# Patient Record
Sex: MALE | Race: WHITE | URBAN - METROPOLITAN AREA
[De-identification: names, ages, dates, MRNs, and addresses within clinical notes are randomized per-mention and may not be internally consistent; named-entity substitution may affect disease eponyms.]

---

## 2018-05-02 ENCOUNTER — OFFICE VISIT (OUTPATIENT)
Dept: FAMILY MEDICINE CLINIC | Age: 28
End: 2018-05-02

## 2018-05-02 VITALS
BODY MASS INDEX: 25.92 KG/M2 | DIASTOLIC BLOOD PRESSURE: 88 MMHG | TEMPERATURE: 98.3 F | HEART RATE: 82 BPM | OXYGEN SATURATION: 98 % | SYSTOLIC BLOOD PRESSURE: 128 MMHG | RESPIRATION RATE: 16 BRPM | WEIGHT: 175 LBS | HEIGHT: 69 IN

## 2018-05-02 DIAGNOSIS — G47.00 INSOMNIA, UNSPECIFIED TYPE: ICD-10-CM

## 2018-05-02 DIAGNOSIS — Z76.0 MEDICATION REFILL: Primary | ICD-10-CM

## 2018-05-02 DIAGNOSIS — F32.A DEPRESSION, UNSPECIFIED DEPRESSION TYPE: ICD-10-CM

## 2018-05-02 RX ORDER — TRAZODONE HYDROCHLORIDE 50 MG/1
50 TABLET ORAL
Qty: 30 TAB | Refills: 3 | Status: SHIPPED | OUTPATIENT
Start: 2018-05-02 | End: 2018-10-04 | Stop reason: SDUPTHER

## 2018-05-02 RX ORDER — BUPROPION HYDROCHLORIDE 150 MG/1
150 TABLET ORAL
COMMUNITY
End: 2018-05-02 | Stop reason: SDUPTHER

## 2018-05-02 RX ORDER — BUPROPION HYDROCHLORIDE 150 MG/1
150 TABLET ORAL
Qty: 30 TAB | Refills: 3 | Status: SHIPPED | OUTPATIENT
Start: 2018-05-02 | End: 2018-11-07 | Stop reason: SDUPTHER

## 2018-05-02 RX ORDER — TRAZODONE HYDROCHLORIDE 50 MG/1
TABLET ORAL
COMMUNITY
End: 2018-05-02 | Stop reason: SDUPTHER

## 2018-05-02 NOTE — PROGRESS NOTES
HISTORY OF PRESENT ILLNESS  Jamila Vasquez is a 32 y.o. male. HPI Comments: Patient presents today to establish care for med refill. Previously under care through [facility] but pt took off a semester and provider no longer available. Past Medical History:  No date: Depression with anxiety  No date: H/O multiple concussions  No date: Insomnia     Past Surgical History:  2011: HX VASECTOMY     Review of patient's family history indicates:    No Known Problems              Mother                    No Known Problems              Father                     Social History Main Topics    Smoking status: Never Smoker                                                              Smokeless status: Never Used                        Alcohol use: Yes             Drug use: No              Sexual activity: Yes                  Current Outpatient Prescriptions:     buPROPion XL (WELLBUTRIN XL) 150 mg tablet, Take 1 Tab by mouth every morning.   traZODone (DESYREL) 50 mg tablet, Take 1 Tab by mouth nightly. Medication Refill   The history is provided by the patient. This is a new problem. The problem has not changed since onset. Pertinent negatives include no chest pain, no abdominal pain, no headaches and no shortness of breath. Review of Systems   Constitutional: Negative for chills and fever. Respiratory: Negative for shortness of breath. Cardiovascular: Negative for chest pain and palpitations. Gastrointestinal: Negative for abdominal pain. Neurological: Negative for headaches. Psychiatric/Behavioral: Positive for depression. The patient has insomnia. Physical Exam   Constitutional: He is oriented to person, place, and time. Vital signs are normal. He appears well-developed and well-nourished. Cardiovascular: Normal rate, regular rhythm, normal heart sounds and intact distal pulses. Pulmonary/Chest: Effort normal and breath sounds normal. No respiratory distress.    Neurological: He is alert and oriented to person, place, and time. Psychiatric: He has a normal mood and affect. His behavior is normal.     Visit Vitals    /88    Pulse 82    Temp 98.3 °F (36.8 °C) (Oral)    Resp 16    Ht 5' 9\" (1.753 m)    Wt 175 lb (79.4 kg)    SpO2 98%    BMI 25.84 kg/m2      ASSESSMENT and PLAN    ICD-10-CM ICD-9-CM    1. Medication refill Z76.0 V68.1 buPROPion XL (WELLBUTRIN XL) 150 mg tablet      traZODone (DESYREL) 50 mg tablet   2. Depression, unspecified depression type F32.9 311 buPROPion XL (WELLBUTRIN XL) 150 mg tablet   3. Insomnia, unspecified type G47.00 780.52 traZODone (DESYREL) 50 mg tablet     Reviewed plan with patient including diagnoses, treatment and follow up. Provided AVS with education on above diagnoses. No further questions/concerns at this time. Pt to follow up as scheduled or sooner if symptoms worsen/fail to improve.

## 2018-05-02 NOTE — MR AVS SNAPSHOT
[address] [place] [address] [phone] 
[phone] Patient: Leslee Monae MRN: [id number] :1990 Visit Information Date & Time Provider Department Dept. Phone Encounter #  
 2018 10:30 AM Ziggy Yeh NP [phone] [id number] Follow-up Instructions Return in about 3 months (around 2018), or if symptoms worsen or fail to improve. Upcoming Health Maintenance Date Due DTaP/Tdap/Td series (1 - Tdap) 10/22/2009 Influenza Age 5 to Adult 2018 Allergies as of 2018  Review Complete On: 2018 By: Doristine Spurling, LPN No Known Allergies Current Immunizations  Never Reviewed No immunizations on file. Not reviewed this visit You Were Diagnosed With   
  
 Codes Comments Medication refill    -  Primary ICD-10-CM: Z76.0 ICD-9-CM: V68.1 Depression, unspecified depression type     ICD-10-CM: F32.9 ICD-9-CM: 790 Insomnia, unspecified type     ICD-10-CM: G47.00 ICD-9-CM: 780.52 Vitals BP Pulse Temp Resp Height(growth percentile) Weight(growth percentile) 128/88 82 98.3 °F (36.8 °C) (Oral) 16 5' 9\" (1.753 m) 175 lb (79.4 kg) SpO2 BMI Smoking Status 98% 25.84 kg/m2 Never Smoker Vitals History BMI and BSA Data Body Mass Index Body Surface Area  
 25.84 kg/m 2 1.97 m 2 Preferred Pharmacy Pharmacy Name Phone [id number] [address] [place] [phone] Your Updated Medication List  
  
   
This list is accurate as of 18 10:35 AM.  Always use your most recent med list.  
  
  
  
  
 buPROPion  mg tablet Commonly known as:  Sriram Lock Take 1 Tab by mouth every morning. traZODone 50 mg tablet Commonly known as:  Georganna Quiver Take 1 Tab by mouth nightly. Prescriptions Sent to Pharmacy Refills buPROPion XL (WELLBUTRIN XL) 150 mg tablet 3 Sig: Take 1 Tab by mouth every morning. Class: Normal  
 Pharmacy: [address] [place] [address] #: [phone] Route: Oral  
 traZODone (DESYREL) 50 mg tablet 3 Sig: Take 1 Tab by mouth nightly. Class: Normal  
 Pharmacy: [address] [place] [address] #: [phone] Route: Oral  
  
Follow-up Instructions Return in about 3 months (around 8/2/2018), or if symptoms worsen or fail to improve. Introducing [facility]! [facility] introduces [employer] patient portal. Now you can access parts of your medical record, email your doctor's office, and request medication refills online. 1. In your internet browser, go to [email or web address]/[email or web address] 2. Click on the First Time User? Click Here link in the Sign In box. You will see the New Member Sign Up page. 3. Enter your [employer] Access Code exactly as it appears below. You will not need to use this code after youve completed the sign-up process. If you do not sign up before the expiration date, you must request a new code. · [employer] Access Code: [email or web address] Expires: 7/31/2018 10:23 AM 
 
4. Enter the last four digits of your Social Security Number (xxxx) and Date of Birth (mm/dd/yyyy) as indicated and click Submit. You will be taken to the next sign-up page. 5. Create a [employer] ID. This will be your [employer] login ID and cannot be changed, so think of one that is secure and easy to remember. 6. Create a [employer] password. You can change your password at any time. 7. Enter your Password Reset Question and Answer. This can be used at a later time if you forget your password. 8. Enter your e-mail address. You will receive e-mail notification when new information is available in [address] 9. Click Sign Up. You can now view and download portions of your medical record. 10. Click the Download Summary menu link to download a portable copy of your medical information. If you have questions, please visit the Frequently Asked Questions section of the [employer] website. Remember, [employer] is NOT to be used for urgent needs. For medical emergencies, dial 911. Now available from your iPhone and Android! Please provide this summary of care documentation to your next provider. If you have any questions after today's visit, please call [phone].

## 2018-05-02 NOTE — PROGRESS NOTES
Rm 1  Pt presents to the clinic for a med refill. Requested Prescriptions     Pending Prescriptions Disp Refills    buPROPion XL (WELLBUTRIN XL) 150 mg tablet 30 Tab      Sig: Take 1 Tab by mouth every morning.  traZODone (DESYREL) 50 mg tablet       Sig: Take  by mouth nightly. Depression Screening:  PHQ over the last two weeks 5/2/2018   Little interest or pleasure in doing things Not at all   Feeling down, depressed or hopeless Not at all   Total Score PHQ 2 0       Learning Assessment:  Learning Assessment 5/2/2018   PRIMARY LEARNER Patient   HIGHEST LEVEL OF EDUCATION - PRIMARY LEARNER  SOME COLLEGE   BARRIERS PRIMARY LEARNER NONE   CO-LEARNER CAREGIVER No   PRIMARY LANGUAGE ENGLISH   LEARNER PREFERENCE PRIMARY DEMONSTRATION   ANSWERED BY Patient   RELATIONSHIP SELF       Abuse Screening:  No flowsheet data found. Health Maintenance reviewed and discussed per provider: yes     Coordination of Care:    1. Have you been to the ER, urgent care clinic since your last visit? Hospitalized since your last visit? no    2. Have you seen or consulted any other health care providers outside of the [address] [place] [address] since your last visit? Include any pap smears or colon screening.  no

## 2018-10-01 DIAGNOSIS — G47.00 INSOMNIA, UNSPECIFIED TYPE: ICD-10-CM

## 2018-10-01 DIAGNOSIS — Z76.0 MEDICATION REFILL: ICD-10-CM

## 2018-10-01 RX ORDER — TRAZODONE HYDROCHLORIDE 50 MG/1
50 TABLET ORAL
Qty: 30 TAB | Refills: 3 | Status: CANCELLED | OUTPATIENT
Start: 2018-10-01

## 2018-10-01 NOTE — TELEPHONE ENCOUNTER
Requested Prescriptions     Pending Prescriptions Disp Refills    traZODone (DESYREL) 50 mg tablet 30 Tab 3     Sig: Take 1 Tab by mouth nightly.

## 2018-10-04 DIAGNOSIS — G47.00 INSOMNIA, UNSPECIFIED TYPE: ICD-10-CM

## 2018-10-04 DIAGNOSIS — Z76.0 MEDICATION REFILL: ICD-10-CM

## 2018-10-04 RX ORDER — TRAZODONE HYDROCHLORIDE 50 MG/1
50 TABLET ORAL
Qty: 30 TAB | Refills: 3 | Status: SHIPPED | OUTPATIENT
Start: 2018-10-04 | End: 2019-02-10 | Stop reason: SDUPTHER

## 2018-12-19 DIAGNOSIS — F32.A DEPRESSION, UNSPECIFIED DEPRESSION TYPE: ICD-10-CM

## 2018-12-19 DIAGNOSIS — Z76.0 MEDICATION REFILL: ICD-10-CM

## 2018-12-19 RX ORDER — BUPROPION HYDROCHLORIDE 150 MG/1
TABLET ORAL
Qty: 90 TAB | Refills: 0 | Status: SHIPPED | OUTPATIENT
Start: 2018-12-19 | End: 2019-04-29 | Stop reason: SDUPTHER

## 2019-04-03 ENCOUNTER — OFFICE VISIT (OUTPATIENT)
Dept: INTERNAL MEDICINE CLINIC | Age: 29
End: 2019-04-03

## 2019-04-03 VITALS
HEART RATE: 84 BPM | TEMPERATURE: 97.8 F | WEIGHT: 183 LBS | DIASTOLIC BLOOD PRESSURE: 92 MMHG | BODY MASS INDEX: 27.11 KG/M2 | OXYGEN SATURATION: 98 % | SYSTOLIC BLOOD PRESSURE: 143 MMHG | RESPIRATION RATE: 14 BRPM | HEIGHT: 69 IN

## 2019-04-03 DIAGNOSIS — I10 ESSENTIAL HYPERTENSION: ICD-10-CM

## 2019-04-03 DIAGNOSIS — G47.00 INSOMNIA, UNSPECIFIED TYPE: ICD-10-CM

## 2019-04-03 DIAGNOSIS — F41.9 ANXIETY AND DEPRESSION: Primary | ICD-10-CM

## 2019-04-03 DIAGNOSIS — F32.A ANXIETY AND DEPRESSION: Primary | ICD-10-CM

## 2019-04-03 RX ORDER — TRAZODONE HYDROCHLORIDE 50 MG/1
TABLET ORAL
Qty: 60 TAB | Refills: 2 | Status: SHIPPED | OUTPATIENT
Start: 2019-04-03 | End: 2019-04-29 | Stop reason: SDUPTHER

## 2019-04-03 RX ORDER — SERTRALINE HYDROCHLORIDE 50 MG/1
50 TABLET, FILM COATED ORAL DAILY
Qty: 30 TAB | Refills: 2 | Status: SHIPPED | OUTPATIENT
Start: 2019-04-03 | End: 2019-04-29 | Stop reason: SDUPTHER

## 2019-04-03 NOTE — PROGRESS NOTES
HISTORY OF PRESENT ILLNESS  Moncho Muir is a 25 y.o. male. HPI  Mr. Gurpreet Nichols presents for increased anxiety and insomnia for the past several months. He has a chronic hx of both and has taken medication intermittently since his teens. For insomnia, hx of Ambien, Lunesta, and Seroquel. He was off medication for several years as insomnia improved, but a little over 1 year ago, symptoms worsened, and he started Trazodone 50 mg. This controlled his symptoms until as of late. For anxiety and depression, he started Wellbutrin  mg at the same time as the Trazodone. He admits this helped his depression more than his anxiety. He feels he is suffering more from anxiety as of late than depression. Hx of Klonopin for anxiety in the past as well. If he had an Rx for this, he reports he would take it ~2-3x weekly. Hx of Zoloft as a teenager. He recalls no AE. 2) HTN - hx of medication use, but he achieved control and was able to start medication. This is our first reading since May which was better. Review of Systems   Psychiatric/Behavioral: The patient is nervous/anxious and has insomnia. Visit Vitals  BP (!) 143/92 (BP 1 Location: Right arm, BP Patient Position: Sitting)   Pulse 84   Temp 97.8 °F (36.6 °C) (Oral)   Resp 14   Ht 5' 9\" (1.753 m)   Wt 183 lb (83 kg)   SpO2 98%   BMI 27.02 kg/m²   L manual 150/100    Physical Exam   Constitutional: He is oriented to person, place, and time. He appears well-developed and well-nourished. No distress. HENT:   Head: Normocephalic and atraumatic. Right Ear: Tympanic membrane, external ear and ear canal normal.   Left Ear: Tympanic membrane, external ear and ear canal normal.   Nose: Nose normal.   Mouth/Throat: Uvula is midline, oropharynx is clear and moist and mucous membranes are normal. No oropharyngeal exudate, posterior oropharyngeal edema, posterior oropharyngeal erythema or tonsillar abscesses.    Eyes: Pupils are equal, round, and reactive to light. Conjunctivae are normal. No scleral icterus. Neck: Neck supple. Cardiovascular: Normal rate, regular rhythm and normal heart sounds. Exam reveals no gallop. No murmur heard. Pulses:       Dorsalis pedis pulses are 2+ on the right side, and 2+ on the left side. Posterior tibial pulses are 2+ on the right side, and 2+ on the left side. No pedal edema. Pulmonary/Chest: Effort normal and breath sounds normal. No respiratory distress. He has no decreased breath sounds. He has no wheezes. He has no rhonchi. He has no rales. Lymphadenopathy:        Head (right side): No submandibular and no tonsillar adenopathy present. Head (left side): No submandibular and no tonsillar adenopathy present. He has no cervical adenopathy. Right: No supraclavicular adenopathy present. Left: No supraclavicular adenopathy present. Neurological: He is alert and oriented to person, place, and time. Skin: Skin is warm and dry. Psychiatric: He has a normal mood and affect. His speech is normal.       ASSESSMENT and PLAN  Diagnoses and all orders for this visit:    1. Anxiety and depression  -     sertraline (ZOLOFT) 50 mg tablet; Take 1 Tab by mouth daily.  - Options discussed: 1) dosage increase of Wellbutrin - we dismissed this option d/t it controlling depression better than anxiety. 2) Addition of SSRI or SNRI - we agree to proceed with this option with a goal of improved anxiety control. 2. Insomnia, unspecified type  -     traZODone (DESYREL) 50 mg tablet; TAKE 2 TABLETS BY MOUTH EVERY DAY AT NIGHT   - Dosage increase. Start with 75 mg x ~2 weeks. Then further increase if needed. Continue with 75 mg if controlled. 3. Essential hypertension  - Closely monitor. Recheck next visit. Follow-up and Dispositions    · Return in about 3 weeks (around 4/24/2019) for follow-up anxiety and insomnia; recheck BP.

## 2019-04-03 NOTE — PROGRESS NOTES
Patient presents to the clinic to discuss increasing his dosage of trazodone and Wellbutrin. Patient complains of waking up at 3 am and increase anxiety. Patient believes the meditation is not as effective as to when he first began the medications.

## 2019-04-29 ENCOUNTER — HOSPITAL ENCOUNTER (OUTPATIENT)
Dept: LAB | Age: 29
Discharge: HOME | End: 2019-04-29
Payer: SELF-PAY

## 2019-04-29 ENCOUNTER — OFFICE VISIT (OUTPATIENT)
Dept: INTERNAL MEDICINE CLINIC | Age: 29
End: 2019-04-29

## 2019-04-29 VITALS
WEIGHT: 181 LBS | HEIGHT: 69 IN | TEMPERATURE: 98.6 F | SYSTOLIC BLOOD PRESSURE: 138 MMHG | DIASTOLIC BLOOD PRESSURE: 92 MMHG | OXYGEN SATURATION: 98 % | HEART RATE: 84 BPM | RESPIRATION RATE: 14 BRPM | BODY MASS INDEX: 26.81 KG/M2

## 2019-04-29 DIAGNOSIS — F32.A ANXIETY AND DEPRESSION: Primary | ICD-10-CM

## 2019-04-29 DIAGNOSIS — F41.9 ANXIETY AND DEPRESSION: Primary | ICD-10-CM

## 2019-04-29 DIAGNOSIS — G47.00 INSOMNIA, UNSPECIFIED TYPE: ICD-10-CM

## 2019-04-29 DIAGNOSIS — I10 ESSENTIAL HYPERTENSION: ICD-10-CM

## 2019-04-29 LAB
ALBUMIN SERPL-MCNC: 4.3 G/DL (ref 3.4–5)
ALBUMIN/GLOB SERPL: 1.4 {RATIO} (ref 0.8–1.7)
ALP SERPL-CCNC: 57 U/L (ref 45–117)
ALT SERPL-CCNC: 34 U/L (ref 16–61)
ANION GAP SERPL CALC-SCNC: 9 MMOL/L (ref 3–18)
AST SERPL-CCNC: 12 U/L (ref 15–37)
BILIRUB SERPL-MCNC: 0.6 MG/DL (ref 0.2–1)
BILIRUB UR QL STRIP: NEGATIVE
BUN SERPL-MCNC: 17 MG/DL (ref 7–18)
BUN/CREAT SERPL: 18 (ref 12–20)
CALCIUM SERPL-MCNC: 9.3 MG/DL (ref 8.5–10.1)
CHLORIDE SERPL-SCNC: 103 MMOL/L (ref 100–108)
CHOLEST SERPL-MCNC: 188 MG/DL
CO2 SERPL-SCNC: 27 MMOL/L (ref 21–32)
CREAT SERPL-MCNC: 0.97 MG/DL (ref 0.6–1.3)
ERYTHROCYTE [DISTWIDTH] IN BLOOD BY AUTOMATED COUNT: 13.1 % (ref 11.6–14.5)
GLOBULIN SER CALC-MCNC: 3.1 G/DL (ref 2–4)
GLUCOSE SERPL-MCNC: 69 MG/DL (ref 74–99)
GLUCOSE UR-MCNC: NEGATIVE MG/DL
HBA1C MFR BLD: 4.5 % (ref 4.2–5.6)
HCT VFR BLD AUTO: 46.5 % (ref 36–48)
HDLC SERPL-MCNC: 68 MG/DL (ref 40–60)
HDLC SERPL: 2.8 {RATIO} (ref 0–5)
HGB BLD-MCNC: 15.5 G/DL (ref 13–16)
KETONES P FAST UR STRIP-MCNC: NEGATIVE MG/DL
LDLC SERPL CALC-MCNC: 96.6 MG/DL (ref 0–100)
LIPID PROFILE,FLP: ABNORMAL
MCH RBC QN AUTO: 33 PG (ref 24–34)
MCHC RBC AUTO-ENTMCNC: 33.3 G/DL (ref 31–37)
MCV RBC AUTO: 99.1 FL (ref 74–97)
PH UR STRIP: 5.5 [PH] (ref 4.6–8)
PLATELET # BLD AUTO: 283 K/UL (ref 135–420)
PMV BLD AUTO: 11.3 FL (ref 9.2–11.8)
POTASSIUM SERPL-SCNC: 4 MMOL/L (ref 3.5–5.5)
PROT SERPL-MCNC: 7.4 G/DL (ref 6.4–8.2)
PROT UR QL STRIP: NEGATIVE
RBC # BLD AUTO: 4.69 M/UL (ref 4.7–5.5)
SODIUM SERPL-SCNC: 139 MMOL/L (ref 136–145)
SP GR UR STRIP: 1.02 (ref 1–1.03)
TRIGL SERPL-MCNC: 117 MG/DL (ref ?–150)
TSH SERPL DL<=0.05 MIU/L-ACNC: 1.12 UIU/ML (ref 0.36–3.74)
UA UROBILINOGEN AMB POC: NORMAL (ref 0.2–1)
URINALYSIS CLARITY POC: CLEAR
URINALYSIS COLOR POC: YELLOW
URINE BLOOD POC: NEGATIVE
URINE LEUKOCYTES POC: NEGATIVE
URINE NITRITES POC: NEGATIVE
VLDLC SERPL CALC-MCNC: 23.4 MG/DL
WBC # BLD AUTO: 6.9 K/UL (ref 4.6–13.2)

## 2019-04-29 PROCEDURE — 85027 COMPLETE CBC AUTOMATED: CPT

## 2019-04-29 PROCEDURE — 80053 COMPREHEN METABOLIC PANEL: CPT

## 2019-04-29 PROCEDURE — 80061 LIPID PANEL: CPT

## 2019-04-29 PROCEDURE — 36415 COLL VENOUS BLD VENIPUNCTURE: CPT

## 2019-04-29 PROCEDURE — 84443 ASSAY THYROID STIM HORMONE: CPT

## 2019-04-29 PROCEDURE — 83036 HEMOGLOBIN GLYCOSYLATED A1C: CPT

## 2019-04-29 RX ORDER — BUPROPION HYDROCHLORIDE 150 MG/1
TABLET ORAL
Qty: 90 TAB | Refills: 0 | Status: SHIPPED | OUTPATIENT
Start: 2019-04-29 | End: 2019-07-29 | Stop reason: SDUPTHER

## 2019-04-29 RX ORDER — TRAZODONE HYDROCHLORIDE 50 MG/1
TABLET ORAL
Qty: 180 TAB | Refills: 0 | Status: SHIPPED | OUTPATIENT
Start: 2019-04-29 | End: 2019-07-29 | Stop reason: SDUPTHER

## 2019-04-29 RX ORDER — SERTRALINE HYDROCHLORIDE 50 MG/1
50 TABLET, FILM COATED ORAL DAILY
Qty: 90 TAB | Refills: 0 | Status: SHIPPED | OUTPATIENT
Start: 2019-04-29 | End: 2019-06-14

## 2019-04-29 NOTE — PROGRESS NOTES
Patient presents to the clinic for a medication evaluation. Patient was prescribed Zoloft. Patient states he feels a slight difference.

## 2019-04-29 NOTE — PATIENT INSTRUCTIONS
High Blood Pressure: Care Instructions  Overview    It's normal for blood pressure to go up and down throughout the day. But if it stays up, you have high blood pressure. Another name for high blood pressure is hypertension. Despite what a lot of people think, high blood pressure usually doesn't cause headaches or make you feel dizzy or lightheaded. It usually has no symptoms. But it does increase your risk of stroke, heart attack, and other problems. You and your doctor will talk about your risks of these problems based on your blood pressure. Your doctor will give you a goal for your blood pressure. Your goal will be based on your health and your age. Lifestyle changes, such as eating healthy and being active, are always important to help lower blood pressure. You might also take medicine to reach your blood pressure goal.  Follow-up care is a key part of your treatment and safety. Be sure to make and go to all appointments, and call your doctor if you are having problems. It's also a good idea to know your test results and keep a list of the medicines you take. How can you care for yourself at home? Medical treatment  · If you stop taking your medicine, your blood pressure will go back up. You may take one or more types of medicine to lower your blood pressure. Be safe with medicines. Take your medicine exactly as prescribed. Call your doctor if you think you are having a problem with your medicine. · Talk to your doctor before you start taking aspirin every day. Aspirin can help certain people lower their risk of a heart attack or stroke. But taking aspirin isn't right for everyone, because it can cause serious bleeding. · See your doctor regularly. You may need to see the doctor more often at first or until your blood pressure comes down. · If you are taking blood pressure medicine, talk to your doctor before you take decongestants or anti-inflammatory medicine, such as ibuprofen.  Some of these medicines can raise blood pressure. · Learn how to check your blood pressure at home. Lifestyle changes  · Stay at a healthy weight. This is especially important if you put on weight around the waist. Losing even 10 pounds can help you lower your blood pressure. · If your doctor recommends it, get more exercise. Walking is a good choice. Bit by bit, increase the amount you walk every day. Try for at least 30 minutes on most days of the week. You also may want to swim, bike, or do other activities. · Avoid or limit alcohol. Talk to your doctor about whether you can drink any alcohol. · Try to limit how much sodium you eat to less than 2,300 milligrams (mg) a day. Your doctor may ask you to try to eat less than 1,500 mg a day. · Eat plenty of fruits (such as bananas and oranges), vegetables, legumes, whole grains, and low-fat dairy products. · Lower the amount of saturated fat in your diet. Saturated fat is found in animal products such as milk, cheese, and meat. Limiting these foods may help you lose weight and also lower your risk for heart disease. · Do not smoke. Smoking increases your risk for heart attack and stroke. If you need help quitting, talk to your doctor about stop-smoking programs and medicines. These can increase your chances of quitting for good. When should you call for help? Call 911 anytime you think you may need emergency care. This may mean having symptoms that suggest that your blood pressure is causing a serious heart or blood vessel problem. Your blood pressure may be over 180/120.   For example, call 911 if:    · You have symptoms of a heart attack. These may include:  ? Chest pain or pressure, or a strange feeling in the chest.  ? Sweating. ? Shortness of breath. ? Nausea or vomiting. ? Pain, pressure, or a strange feeling in the back, neck, jaw, or upper belly or in one or both shoulders or arms. ? Lightheadedness or sudden weakness.   ? A fast or irregular heartbeat.     · You have symptoms of a stroke. These may include:  ? Sudden numbness, tingling, weakness, or loss of movement in your face, arm, or leg, especially on only one side of your body. ? Sudden vision changes. ? Sudden trouble speaking. ? Sudden confusion or trouble understanding simple statements. ? Sudden problems with walking or balance. ? A sudden, severe headache that is different from past headaches.     · You have severe back or belly pain.    Do not wait until your blood pressure comes down on its own. Get help right away.   Call your doctor now or seek immediate care if:    · Your blood pressure is much higher than normal (such as 180/120 or higher), but you don't have symptoms.     · You think high blood pressure is causing symptoms, such as:  ? Severe headache.  ? Blurry vision.    Watch closely for changes in your health, and be sure to contact your doctor if:    · Your blood pressure measures higher than your doctor recommends at least 2 times. That means the top number is higher or the bottom number is higher, or both.     · You think you may be having side effects from your blood pressure medicine. Where can you learn more? Go to http://karlos-chris.info/. Enter U584 in the search box to learn more about \"High Blood Pressure: Care Instructions. \"  Current as of: July 22, 2018  Content Version: 11.9  © 5984-0937 [employer]. Care instructions adapted under license by [employer] (which disclaims liability or warranty for this information). If you have questions about a medical condition or this instruction, always ask your healthcare professional. [place] [address] any warranty or liability for your use of this information. DASH Diet: Care Instructions  Your Care Instructions    The DASH diet is an eating plan that can help lower your blood pressure. DASH stands for Dietary Approaches to Stop Hypertension.  Hypertension is high blood pressure. The DASH diet focuses on eating foods that are high in calcium, potassium, and magnesium. These nutrients can lower blood pressure. The foods that are highest in these nutrients are fruits, vegetables, low-fat dairy products, nuts, seeds, and legumes. But taking calcium, potassium, and magnesium supplements instead of eating foods that are high in those nutrients does not have the same effect. The DASH diet also includes whole grains, fish, and poultry. The DASH diet is one of several lifestyle changes your doctor may recommend to lower your high blood pressure. Your doctor may also want you to decrease the amount of sodium in your diet. Lowering sodium while following the DASH diet can lower blood pressure even further than just the DASH diet alone. Follow-up care is a key part of your treatment and safety. Be sure to make and go to all appointments, and call your doctor if you are having problems. It's also a good idea to know your test results and keep a list of the medicines you take. How can you care for yourself at home? Following the DASH diet  · Eat 4 to 5 servings of fruit each day. A serving is 1 medium-sized piece of fruit, ½ cup chopped or canned fruit, 1/4 cup dried fruit, or 4 ounces (½ cup) of fruit juice. Choose fruit more often than fruit juice. · Eat 4 to 5 servings of vegetables each day. A serving is 1 cup of lettuce or raw leafy vegetables, ½ cup of chopped or cooked vegetables, or 4 ounces (½ cup) of vegetable juice. Choose vegetables more often than vegetable juice. · Get 2 to 3 servings of low-fat and fat-free dairy each day. A serving is 8 ounces of milk, 1 cup of yogurt, or 1 ½ ounces of cheese. · Eat 6 to 8 servings of grains each day. A serving is 1 slice of bread, 1 ounce of dry cereal, or ½ cup of cooked rice, pasta, or cooked cereal. Try to choose whole-grain products as much as possible. · Limit lean meat, poultry, and fish to 2 servings each day.  A serving is 3 ounces, about the size of a deck of cards. · Eat 4 to 5 servings of nuts, seeds, and legumes (cooked dried beans, lentils, and split peas) each week. A serving is 1/3 cup of nuts, 2 tablespoons of seeds, or ½ cup of cooked beans or peas. · Limit fats and oils to 2 to 3 servings each day. A serving is 1 teaspoon of vegetable oil or 2 tablespoons of salad dressing. · Limit sweets and added sugars to 5 servings or less a week. A serving is 1 tablespoon jelly or jam, ½ cup sorbet, or 1 cup of lemonade. · Eat less than 2,300 milligrams (mg) of sodium a day. If you limit your sodium to 1,500 mg a day, you can lower your blood pressure even more. Tips for success  · Start small. Do not try to make dramatic changes to your diet all at once. You might feel that you are missing out on your favorite foods and then be more likely to not follow the plan. Make small changes, and stick with them. Once those changes become habit, add a few more changes. · Try some of the following:  ? Make it a goal to eat a fruit or vegetable at every meal and at snacks. This will make it easy to get the recommended amount of fruits and vegetables each day. ? Try yogurt topped with fruit and nuts for a snack or healthy dessert. ? Add lettuce, tomato, cucumber, and onion to sandwiches. ? Combine a ready-made pizza crust with low-fat mozzarella cheese and lots of vegetable toppings. Try using tomatoes, squash, spinach, broccoli, carrots, cauliflower, and onions. ? Have a variety of cut-up vegetables with a low-fat dip as an appetizer instead of chips and dip. ? Sprinkle sunflower seeds or chopped almonds over salads. Or try adding chopped walnuts or almonds to cooked vegetables. ? Try some vegetarian meals using beans and peas. Add garbanzo or kidney beans to salads. Make burritos and tacos with mashed phelps beans or black beans. Where can you learn more? Go to http://everett-chris.info/.   Enter E421 in the search box to learn more about \"DASH Diet: Care Instructions. \"  Current as of: July 22, 2018  Content Version: 11.9  © 2392-8320 [employer]. Care instructions adapted under license by [employer] (which disclaims liability or warranty for this information). If you have questions about a medical condition or this instruction, always ask your healthcare professional. [address] any warranty or liability for your use of this information.

## 2019-04-29 NOTE — PROGRESS NOTES
HISTORY OF PRESENT ILLNESS  Evelyn Correa is a 25 y.o. male. HPI  Mr. Raina Chakraborty presents for follow-up anxiety, depression, and insomnia. He reports improved anxiety and depression with addition of Zoloft. He reports significantly improved insomnia with dosage increase of Trazodone. He denies AE with any medication. He desires to continue at its current dosage. 2) HTN - hx of requiring medication, but he increased his exercise and was able to control it. He would like to work to increase his exercise to control this now. He admits to eating a lot of pre-prepared meals and eating out as well. He cooks very little. Review of Systems   Neurological: Negative for dizziness and headaches. Psychiatric/Behavioral: Positive for depression. The patient is nervous/anxious and has insomnia (much improved). Visit Vitals  BP (!) 138/92 (BP 1 Location: Right arm, BP Patient Position: Sitting)   Pulse 84   Temp 98.6 °F (37 °C) (Oral)   Resp 14   Ht 5' 9\" (1.753 m)   Wt 181 lb (82.1 kg)   SpO2 98%   BMI 26.73 kg/m²       Physical Exam   Constitutional: He is oriented to person, place, and time. He appears well-developed and well-nourished. No distress. HENT:   Head: Normocephalic and atraumatic. Right Ear: Tympanic membrane, external ear and ear canal normal.   Left Ear: Tympanic membrane, external ear and ear canal normal.   Nose: Nose normal.   Mouth/Throat: Uvula is midline, oropharynx is clear and moist and mucous membranes are normal. No oropharyngeal exudate, posterior oropharyngeal edema, posterior oropharyngeal erythema or tonsillar abscesses. Eyes: Pupils are equal, round, and reactive to light. Conjunctivae are normal. No scleral icterus. Neck: Neck supple. Cardiovascular: Normal rate, regular rhythm and normal heart sounds. Exam reveals no gallop. No murmur heard. Pulses:       Dorsalis pedis pulses are 2+ on the right side, and 2+ on the left side.         Posterior tibial pulses are 2+ on the right side, and 2+ on the left side. No pedal edema. Pulmonary/Chest: Effort normal and breath sounds normal. No respiratory distress. He has no decreased breath sounds. He has no wheezes. He has no rhonchi. He has no rales. Lymphadenopathy:        Head (right side): No submandibular and no tonsillar adenopathy present. Head (left side): No submandibular and no tonsillar adenopathy present. He has no cervical adenopathy. Right: No supraclavicular adenopathy present. Left: No supraclavicular adenopathy present. Neurological: He is alert and oriented to person, place, and time. Skin: Skin is warm and dry. Psychiatric: He has a normal mood and affect. His speech is normal.     Results for orders placed or performed in visit on 04/29/19   AMB POC URINALYSIS DIP STICK AUTO W/O MICRO   Result Value Ref Range    Color (UA POC) Yellow     Clarity (UA POC) Clear     Glucose (UA POC) Negative Negative    Bilirubin (UA POC) Negative Negative    Ketones (UA POC) Negative Negative    Specific gravity (UA POC) 1.025 1.001 - 1.035    Blood (UA POC) Negative Negative    pH (UA POC) 5.5 4.6 - 8.0    Protein (UA POC) Negative Negative    Urobilinogen (UA POC) 0.2 mg/dL 0.2 - 1    Nitrites (UA POC) Negative Negative    Leukocyte esterase (UA POC) Negative Negative       ASSESSMENT and PLAN  Diagnoses and all orders for this visit:    1. Anxiety and depression  -     sertraline (ZOLOFT) 50 mg tablet; Take 1 Tab by mouth daily. -     buPROPion XL (WELLBUTRIN XL) 150 mg tablet; TAKE 1 TABLET BY MOUTH DAILY IN THE MORNING  - Continue same. Patient to notify me if he desires a dosage increase of Zoloft prior to our next visit in 3 months. 2. Insomnia, unspecified type  -     traZODone (DESYREL) 50 mg tablet; TAKE 2 TABLETS BY MOUTH EVERY DAY AT NIGHT    3. Essential hypertension  -     CBC W/O DIFF; Future  -     METABOLIC PANEL, COMPREHENSIVE; Future  -     LIPID PANEL;  Future  -     HEMOGLOBIN A1C W/O EAG; Future  -     TSH 3RD GENERATION; Future  -     AMB POC URINALYSIS DIP STICK AUTO W/O MICRO  - Plan to improve lifestyle over the next 3 months and recheck. If still not controlled, will resume medication. Risks of chronically uncontrolled HTN discussed. Follow-up and Dispositions    · Return in about 3 months (around 7/29/2019) for follow-up HTN, anxiety, depression, insomnia.

## 2019-07-29 ENCOUNTER — OFFICE VISIT (OUTPATIENT)
Dept: INTERNAL MEDICINE CLINIC | Age: 29
End: 2019-07-29

## 2019-07-29 VITALS
HEIGHT: 69 IN | SYSTOLIC BLOOD PRESSURE: 136 MMHG | BODY MASS INDEX: 28.14 KG/M2 | TEMPERATURE: 98.3 F | OXYGEN SATURATION: 98 % | RESPIRATION RATE: 16 BRPM | HEART RATE: 76 BPM | WEIGHT: 190 LBS | DIASTOLIC BLOOD PRESSURE: 86 MMHG

## 2019-07-29 DIAGNOSIS — F41.9 ANXIETY AND DEPRESSION: Primary | ICD-10-CM

## 2019-07-29 DIAGNOSIS — G47.00 INSOMNIA, UNSPECIFIED TYPE: ICD-10-CM

## 2019-07-29 DIAGNOSIS — I10 ESSENTIAL HYPERTENSION: ICD-10-CM

## 2019-07-29 DIAGNOSIS — F32.A ANXIETY AND DEPRESSION: Primary | ICD-10-CM

## 2019-07-29 RX ORDER — SERTRALINE HYDROCHLORIDE 100 MG/1
150 TABLET, FILM COATED ORAL DAILY
Qty: 45 TAB | Refills: 5 | Status: SHIPPED | OUTPATIENT
Start: 2019-07-29 | End: 2019-12-30 | Stop reason: SDUPTHER

## 2019-07-29 RX ORDER — BUPROPION HYDROCHLORIDE 150 MG/1
TABLET ORAL
Qty: 30 TAB | Refills: 5 | Status: SHIPPED | OUTPATIENT
Start: 2019-07-29 | End: 2019-12-30 | Stop reason: SDUPTHER

## 2019-07-29 RX ORDER — TRAZODONE HYDROCHLORIDE 50 MG/1
TABLET ORAL
Qty: 60 TAB | Refills: 5 | Status: SHIPPED | OUTPATIENT
Start: 2019-07-29 | End: 2019-12-30 | Stop reason: SDUPTHER

## 2019-07-29 NOTE — PROGRESS NOTES
1. Have you been to the ER, urgent care clinic since your last visit? Hospitalized since your last visit? No    2. Have you seen or consulted any other health care providers outside of the [address] [place] [address] since your last visit? Include any pap smears or colon screening. No     Patient presents in office today for routine care.   Patient concerns: med eval

## 2019-07-29 NOTE — PROGRESS NOTES
HISTORY OF PRESENT ILLNESS  Amanda Williamson is a 25 y.o. male. HPI  Mr. Sheila Miguel presents for follow-up anxiety, depression, insomnia, and HTN. 1) Anxiety/depression/anxiety - he feels his depression and insomnia are well-controlled with his current therapy, but c/o anxiety being less well-controlled. He feels his anxiety symptoms remain above what would be expected for certain stressful situations. Anxiety is overall better, however. - He continues to follow with his counseling weekly which is going well. 2) HTN - improved. He joined a gym. He is eating better. Review of Systems   Psychiatric/Behavioral: Positive for depression (controlled). The patient is nervous/anxious (less well-controlled) and has insomnia (controlled). Visit Vitals  /86 (BP 1 Location: Right arm, BP Patient Position: Sitting)   Pulse 76   Temp 98.3 °F (36.8 °C) (Oral)   Resp 16   Ht 5' 9\" (1.753 m)   Wt 190 lb (86.2 kg)   SpO2 98%   BMI 28.06 kg/m²       Physical Exam   Constitutional: He is oriented to person, place, and time. He appears well-developed and well-nourished. No distress. HENT:   Head: Normocephalic and atraumatic. Right Ear: Tympanic membrane, external ear and ear canal normal.   Left Ear: Tympanic membrane, external ear and ear canal normal.   Nose: Nose normal.   Mouth/Throat: Uvula is midline, oropharynx is clear and moist and mucous membranes are normal. No oropharyngeal exudate, posterior oropharyngeal edema, posterior oropharyngeal erythema or tonsillar abscesses. Eyes: Pupils are equal, round, and reactive to light. Conjunctivae are normal. No scleral icterus. Neck: Neck supple. Cardiovascular: Normal rate, regular rhythm and normal heart sounds. Exam reveals no gallop. No murmur heard. Pulses:       Dorsalis pedis pulses are 2+ on the right side, and 2+ on the left side. Posterior tibial pulses are 2+ on the right side, and 2+ on the left side. No pedal edema. Pulmonary/Chest: Effort normal and breath sounds normal. No respiratory distress. He has no decreased breath sounds. He has no wheezes. He has no rhonchi. He has no rales. Lymphadenopathy:        Head (right side): No submandibular and no tonsillar adenopathy present. Head (left side): No submandibular and no tonsillar adenopathy present. He has no cervical adenopathy. Right: No supraclavicular adenopathy present. Left: No supraclavicular adenopathy present. Neurological: He is alert and oriented to person, place, and time. Skin: Skin is warm and dry. Psychiatric: He has a normal mood and affect. His speech is normal.       ASSESSMENT and PLAN  Diagnoses and all orders for this visit:    1. Anxiety and depression  -     sertraline (ZOLOFT) 100 mg tablet; Take 1.5 Tabs by mouth daily.   - Dosage increase to 150 mg daily from 100 mg daily. -     buPROPion XL (WELLBUTRIN XL) 150 mg tablet; TAKE 1 TABLET BY MOUTH DAILY IN THE MORNING    2. Insomnia, unspecified type  -     traZODone (DESYREL) 50 mg tablet; TAKE 2 TABLETS BY MOUTH EVERY DAY AT NIGHT    3. Essential hypertension  - Continue with improved lifestyle. BP is lifestyle-controlled at present. Follow-up and Dispositions    · Return in about 6 months (around 1/29/2020) for follow-up anxiety, depression, insomnia.

## 2019-09-24 DIAGNOSIS — F41.9 ANXIETY AND DEPRESSION: ICD-10-CM

## 2019-09-24 DIAGNOSIS — F32.A ANXIETY AND DEPRESSION: ICD-10-CM

## 2019-09-26 RX ORDER — SERTRALINE HYDROCHLORIDE 100 MG/1
TABLET, FILM COATED ORAL
Qty: 90 TAB | Refills: 0 | Status: SHIPPED | OUTPATIENT
Start: 2019-09-26 | End: 2019-12-30 | Stop reason: SDUPTHER

## 2019-09-26 NOTE — TELEPHONE ENCOUNTER
Sent electronically:    Requested Prescriptions     Signed Prescriptions Disp Refills    sertraline (ZOLOFT) 100 mg tablet 90 Tab 0     Sig: TAKE 1 TABLET BY MOUTH EVERY DAY     Authorizing Provider: Aries Mosquera     This is 90 day supply until he can get an appt. With ASHVIN Bermudez for follow up.

## 2019-09-26 NOTE — TELEPHONE ENCOUNTER
Patient contacted at home number. 2 patient identifiers confirmed. Patient informed of below. Patient verbalized understanding. Patient scheduled for follow up with Sarina for Monday, December 23, 2019 04:00 PM.  No other questions at this time.

## 2019-12-30 ENCOUNTER — OFFICE VISIT (OUTPATIENT)
Dept: INTERNAL MEDICINE CLINIC | Age: 29
End: 2019-12-30

## 2019-12-30 VITALS
HEART RATE: 83 BPM | RESPIRATION RATE: 16 BRPM | WEIGHT: 196.2 LBS | OXYGEN SATURATION: 98 % | DIASTOLIC BLOOD PRESSURE: 100 MMHG | BODY MASS INDEX: 29.06 KG/M2 | TEMPERATURE: 98.2 F | HEIGHT: 69 IN | SYSTOLIC BLOOD PRESSURE: 160 MMHG

## 2019-12-30 DIAGNOSIS — F32.A ANXIETY AND DEPRESSION: Primary | ICD-10-CM

## 2019-12-30 DIAGNOSIS — F41.9 ANXIETY AND DEPRESSION: Primary | ICD-10-CM

## 2019-12-30 DIAGNOSIS — I10 ESSENTIAL HYPERTENSION: ICD-10-CM

## 2019-12-30 DIAGNOSIS — Z23 ENCOUNTER FOR IMMUNIZATION: ICD-10-CM

## 2019-12-30 DIAGNOSIS — G47.00 INSOMNIA, UNSPECIFIED TYPE: ICD-10-CM

## 2019-12-30 RX ORDER — SERTRALINE HYDROCHLORIDE 100 MG/1
100 TABLET, FILM COATED ORAL DAILY
Qty: 30 TAB | Refills: 11 | Status: SHIPPED | OUTPATIENT
Start: 2019-12-30 | End: 2020-06-10 | Stop reason: SDUPTHER

## 2019-12-30 RX ORDER — TRAZODONE HYDROCHLORIDE 50 MG/1
TABLET ORAL
Qty: 60 TAB | Refills: 11 | Status: SHIPPED | OUTPATIENT
Start: 2019-12-30 | End: 2020-02-21 | Stop reason: ALTCHOICE

## 2019-12-30 RX ORDER — BUPROPION HYDROCHLORIDE 150 MG/1
TABLET ORAL
Qty: 30 TAB | Refills: 11 | Status: SHIPPED | OUTPATIENT
Start: 2019-12-30 | End: 2020-10-16 | Stop reason: SDUPTHER

## 2019-12-30 NOTE — PROGRESS NOTES
Teodora Stoll is a 30 y.o.  male presents today for office visit for follow up. Pt would also like to discuss  depression. Pt is not fasting. Pt is in Room # 12      1. Have you been to the ER, urgent care clinic since your last visit? Hospitalized since your last visit? No    2. Have you seen or consulted any other health care providers outside of the [address] [place] [address] since your last visit? Include any pap smears or colon screening. No    Upcoming Appts  none    Health Maintenance reviewed      VORB: No orders of the defined types were placed in this encounter.   Sabas Melgoza LPN

## 2019-12-30 NOTE — PROGRESS NOTES
HISTORY OF PRESENT ILLNESS  Symone Epps is a 30 y.o. male. HPI  F/u anxiety/depression/insomnia - reports very well-controlled. - Only taking Trazodone 25-50 mg nightly. - Taking Wellbutrin xl 150 mg daily. - Taking Zoloft 100 mg daily. Reports dosage increase was unnecessary so has since gone back to the lower dosage. 2) HTN - BP significantly increased from previous.   - Has been working out less. Admits to higher-Na diet. Is drinking less EtOH - 2-3 beers/night max. - Fam hx HTN - both parents. Review of Systems   Respiratory: Negative for shortness of breath. Cardiovascular: Negative for chest pain and leg swelling. Neurological: Negative for dizziness and headaches. Psychiatric/Behavioral: Positive for depression (controlled). The patient is nervous/anxious (controlled) and has insomnia (controlled). Visit Vitals  BP (!) 160/100 (BP 1 Location: Left arm, BP Patient Position: Sitting)   Pulse 83   Temp 98.2 °F (36.8 °C) (Oral)   Resp 16   Ht 5' 9\" (1.753 m)   Wt 196 lb 3.2 oz (89 kg)   SpO2 98%   BMI 28.97 kg/m²     Wt Readings from Last 3 Encounters:   12/30/19 196 lb 3.2 oz (89 kg)   07/29/19 190 lb (86.2 kg)   04/29/19 181 lb (82.1 kg)     BP Readings from Last 3 Encounters:   12/30/19 (!) 160/100   07/29/19 136/86   04/29/19 (!) 138/92       Physical Exam  Constitutional:       General: He is not in acute distress. Appearance: Normal appearance. He is well-developed. HENT:      Head: Normocephalic and atraumatic. Right Ear: Tympanic membrane, ear canal and external ear normal.      Left Ear: Tympanic membrane, ear canal and external ear normal.      Nose: Nose normal.      Mouth/Throat:      Mouth: Mucous membranes are moist.      Pharynx: Uvula midline. No oropharyngeal exudate or posterior oropharyngeal erythema. Tonsils: No tonsillar abscesses. Eyes:      General: No scleral icterus.      Conjunctiva/sclera: Conjunctivae normal.      Pupils: Pupils are equal, round, and reactive to light. Neck:      Musculoskeletal: Neck supple. Cardiovascular:      Rate and Rhythm: Normal rate and regular rhythm. Pulses: Normal pulses. Dorsalis pedis pulses are 2+ on the right side and 2+ on the left side. Posterior tibial pulses are 2+ on the right side and 2+ on the left side. Heart sounds: Normal heart sounds. No murmur. No gallop. Pulmonary:      Effort: Pulmonary effort is normal. No respiratory distress. Breath sounds: Normal breath sounds. No decreased breath sounds, wheezing, rhonchi or rales. Musculoskeletal:      Right lower leg: No edema. Left lower leg: No edema. Lymphadenopathy:      Head:      Right side of head: No submandibular or tonsillar adenopathy. Left side of head: No submandibular or tonsillar adenopathy. Cervical: No cervical adenopathy. Upper Body:      Right upper body: No supraclavicular adenopathy. Left upper body: No supraclavicular adenopathy. Skin:     General: Skin is warm and dry. Neurological:      Mental Status: He is alert and oriented to person, place, and time. Psychiatric:         Speech: Speech normal.         ASSESSMENT and PLAN  Diagnoses and all orders for this visit:    1. Anxiety and depression  -     sertraline (ZOLOFT) 100 mg tablet; Take 1 Tab by mouth daily. -     buPROPion XL (WELLBUTRIN XL) 150 mg tablet; TAKE 1 TABLET BY MOUTH DAILY IN THE MORNING  - Well-controlled. Continue current. 2. Insomnia, unspecified type  -     traZODone (DESYREL) 50 mg tablet; TAKE 2 TABLETS BY MOUTH EVERY DAY AT NIGHT    3. Essential hypertension  - Medication discussed. Patient wishes to hold, improve lifestyle, recheck 1 month. Advised to check some outside BP's if at all possible. Bring in for review.      4. Encounter for immunization  -     INFLUENZA VIRUS VAC QUAD,SPLIT,PRESV FREE SYRINGE IM  -     [place] IMMUNIZ ADMIN,1 SINGLE/COMB VAC/TOXOID      Follow-up and Dispositions    · Return in about 1 month (around 1/30/2020) for f/u BP.

## 2020-02-21 ENCOUNTER — OFFICE VISIT (OUTPATIENT)
Dept: INTERNAL MEDICINE CLINIC | Age: 30
End: 2020-02-21

## 2020-02-21 VITALS
OXYGEN SATURATION: 97 % | RESPIRATION RATE: 18 BRPM | HEART RATE: 79 BPM | HEIGHT: 69 IN | BODY MASS INDEX: 29.33 KG/M2 | TEMPERATURE: 98.2 F | SYSTOLIC BLOOD PRESSURE: 146 MMHG | WEIGHT: 198 LBS | DIASTOLIC BLOOD PRESSURE: 94 MMHG

## 2020-02-21 DIAGNOSIS — F32.A ANXIETY AND DEPRESSION: ICD-10-CM

## 2020-02-21 DIAGNOSIS — I10 ESSENTIAL HYPERTENSION: Primary | ICD-10-CM

## 2020-02-21 DIAGNOSIS — F41.9 ANXIETY AND DEPRESSION: ICD-10-CM

## 2020-02-21 DIAGNOSIS — F51.04 CHRONIC INSOMNIA: ICD-10-CM

## 2020-02-21 RX ORDER — METOPROLOL SUCCINATE 50 MG/1
50 TABLET, EXTENDED RELEASE ORAL DAILY
Qty: 30 TAB | Refills: 2 | Status: SHIPPED | OUTPATIENT
Start: 2020-02-21 | End: 2020-03-23 | Stop reason: ALTCHOICE

## 2020-02-21 RX ORDER — MIRTAZAPINE 15 MG/1
15 TABLET, FILM COATED ORAL
Qty: 30 TAB | Refills: 2 | Status: SHIPPED | OUTPATIENT
Start: 2020-02-21 | End: 2020-03-23 | Stop reason: ALTCHOICE

## 2020-02-21 NOTE — PROGRESS NOTES
HISTORY OF PRESENT ILLNESS  Yohannes Sánchez is a 30 y.o. male. HPI  Returns to care for f/u.  1) HTN - improved but remains elevated. He has resumed a healthier lifestyle with exercise resumption and improved diet. - C/o frequent temporal head pain and swelling. 2) Chronic insomnia - c/o Trazodone making anxiety worse and c/o increased temporal swelling with use. He desires an alternative. - Hx Seroquel, Ambien, Lunesta, Risperal for insomnia in the past. Believes they all worked (it has been years since he took these) but likely changed due to AE. Reports they were concerned about hx of substance abuse with Ambien. This is remote. Review of Systems   HENT: Negative for tinnitus. Eyes: Negative for blurred vision. Respiratory: Negative for shortness of breath. Cardiovascular: Negative for chest pain. Neurological: Positive for headaches. Negative for dizziness. Psychiatric/Behavioral: The patient is nervous/anxious and has insomnia. Visit Vitals  BP (!) 146/94 Comment: L manual   Pulse 79   Temp 98.2 °F (36.8 °C) (Oral)   Resp 18   Ht 5' 9\" (1.753 m)   Wt 198 lb (89.8 kg)   SpO2 97%   BMI 29.24 kg/m²       Physical Exam  Constitutional:       General: He is not in acute distress. Appearance: Normal appearance. He is well-developed. HENT:      Head: Normocephalic and atraumatic. Comments: No temporal swelling or erythema bilaterally. Right Ear: Tympanic membrane, ear canal and external ear normal.      Left Ear: Tympanic membrane, ear canal and external ear normal.      Nose: Nose normal.      Mouth/Throat:      Mouth: Mucous membranes are moist.      Pharynx: Uvula midline. No oropharyngeal exudate or posterior oropharyngeal erythema. Tonsils: No tonsillar abscesses. Eyes:      General: No scleral icterus. Conjunctiva/sclera: Conjunctivae normal.      Pupils: Pupils are equal, round, and reactive to light. Neck:      Musculoskeletal: Neck supple. Cardiovascular:      Rate and Rhythm: Normal rate and regular rhythm. Pulses: Normal pulses. Dorsalis pedis pulses are 2+ on the right side and 2+ on the left side. Posterior tibial pulses are 2+ on the right side and 2+ on the left side. Heart sounds: Normal heart sounds. No murmur. No gallop. Pulmonary:      Effort: Pulmonary effort is normal. No respiratory distress. Breath sounds: Normal breath sounds. No decreased breath sounds, wheezing, rhonchi or rales. Musculoskeletal:      Right lower leg: No edema. Left lower leg: No edema. Lymphadenopathy:      Head:      Right side of head: No submandibular or tonsillar adenopathy. Left side of head: No submandibular or tonsillar adenopathy. Cervical: No cervical adenopathy. Upper Body:      Right upper body: No supraclavicular adenopathy. Left upper body: No supraclavicular adenopathy. Skin:     General: Skin is warm and dry. Neurological:      Mental Status: He is alert and oriented to person, place, and time. Psychiatric:         Speech: Speech normal.         ASSESSMENT and PLAN  Diagnoses and all orders for this visit:    1. Essential hypertension  -     metoprolol succinate (TOPROL-XL) 50 mg XL tablet; Take 1 Tab by mouth daily.   - Trial medication for improved BP as well as beneficial effects on anxiety. Cautioned regarding ED. If this should occur, will change. 2. Chronic insomnia  -     mirtazapine (REMERON) 15 mg tablet; Take 1 Tab by mouth nightly. To start, take 1/2 tab nightly x 1-2 weeks. - Discussed we will monitor weight with this. - D/c Trazodone. 3. Anxiety and depression  - Cont Zoloft, Wellbutrin. Follow-up and Dispositions    · Return in about 1 month (around 3/21/2020) for follow-up BP, insomnia.

## 2020-02-21 NOTE — PROGRESS NOTES
Rm: 12    Chief Complaint   Patient presents with    Anxiety     Depression Screening:  3 most recent PHQ Screens 4/3/2019 5/2/2018   Little interest or pleasure in doing things Not at all Not at all   Feeling down, depressed, irritable, or hopeless Several days Not at all   Total Score PHQ 2 1 0       Learning Assessment:  Learning Assessment 5/2/2018   PRIMARY LEARNER Patient   HIGHEST LEVEL OF EDUCATION - PRIMARY LEARNER  SOME COLLEGE   BARRIERS PRIMARY LEARNER NONE   CO-LEARNER CAREGIVER No   PRIMARY LANGUAGE ENGLISH   LEARNER PREFERENCE PRIMARY DEMONSTRATION   ANSWERED BY Patient   RELATIONSHIP SELF       Abuse Screening:  No flowsheet data found. Health Maintenance reviewed and discussed per provider: yes     Coordination of Care:    1. Have you been to the ER, urgent care clinic since your last visit? Hospitalized since your last visit? no    2. Have you seen or consulted any other health care providers outside of the [address] [place] [address] since your last visit? Include any pap smears or colon screening.  no

## 2020-03-23 ENCOUNTER — OFFICE VISIT (OUTPATIENT)
Dept: INTERNAL MEDICINE CLINIC | Age: 30
End: 2020-03-23

## 2020-03-23 VITALS
HEIGHT: 69 IN | TEMPERATURE: 97.7 F | HEART RATE: 72 BPM | OXYGEN SATURATION: 96 % | BODY MASS INDEX: 31.4 KG/M2 | DIASTOLIC BLOOD PRESSURE: 86 MMHG | WEIGHT: 212 LBS | RESPIRATION RATE: 18 BRPM | SYSTOLIC BLOOD PRESSURE: 146 MMHG

## 2020-03-23 DIAGNOSIS — F51.04 CHRONIC INSOMNIA: ICD-10-CM

## 2020-03-23 DIAGNOSIS — I10 ESSENTIAL HYPERTENSION: Primary | ICD-10-CM

## 2020-03-23 RX ORDER — ESZOPICLONE 2 MG/1
2 TABLET, FILM COATED ORAL
Qty: 30 TAB | Refills: 2 | Status: SHIPPED | OUTPATIENT
Start: 2020-03-23 | End: 2020-04-29

## 2020-03-23 RX ORDER — AMLODIPINE BESYLATE 5 MG/1
5 TABLET ORAL DAILY
Qty: 30 TAB | Refills: 2 | Status: SHIPPED | OUTPATIENT
Start: 2020-03-23 | End: 2020-04-29 | Stop reason: SDUPTHER

## 2020-03-23 NOTE — PROGRESS NOTES
Rm: 11    Chief Complaint   Patient presents with    Hypertension     Depression Screening:  3 most recent PHQ Screens 4/3/2019 5/2/2018   Little interest or pleasure in doing things Not at all Not at all   Feeling down, depressed, irritable, or hopeless Several days Not at all   Total Score PHQ 2 1 0       Learning Assessment:  Learning Assessment 5/2/2018   PRIMARY LEARNER Patient   HIGHEST LEVEL OF EDUCATION - PRIMARY LEARNER  SOME COLLEGE   BARRIERS PRIMARY LEARNER NONE   CO-LEARNER CAREGIVER No   PRIMARY LANGUAGE ENGLISH   LEARNER PREFERENCE PRIMARY DEMONSTRATION   ANSWERED BY Patient   RELATIONSHIP SELF       Abuse Screening:  No flowsheet data found. Health Maintenance reviewed and discussed per provider: yes     Coordination of Care:    1. Have you been to the ER, urgent care clinic since your last visit? Hospitalized since your last visit? no    2. Have you seen or consulted any other health care providers outside of the [address] [place] [address] since your last visit? Include any pap smears or colon screening.  no

## 2020-03-23 NOTE — PROGRESS NOTES
HISTORY OF PRESENT ILLNESS  Shawna Steele is a 30 y.o. male. HPI   Presents for follow-up from visit 1 month ago. See previous note. 1) HTN - BP not significantly improved with Toprol XL 50 mg daily.   - Does believe Toprol has helped some with his anxiety. - C/o jhony temporal swelling. 2) Insomnia - Remeron working well, but has gained 14 lbs since his last visit. Denies any significant change of his lifestyle. - Hx of Trazodone, melatonin, Ambien, Lunesta, Seroquel, and Risperidone. - Ambien and Lunesta worked. 3) Anxiety - he reports good control of this. Review of Systems   Psychiatric/Behavioral: Positive for depression (controlled). The patient is nervous/anxious (controlled) and has insomnia. Visit Vitals  /86 Comment: B manual   Pulse 72   Temp 97.7 °F (36.5 °C) (Oral)   Resp 18   Ht 5' 9\" (1.753 m)   Wt 212 lb (96.2 kg)   SpO2 96%   BMI 31.31 kg/m²       Physical Exam  Constitutional:       General: He is not in acute distress. Appearance: Normal appearance. He is well-developed. Comments: I do not appreciate any temporal swelling or abnormality. HENT:      Head: Normocephalic and atraumatic. Right Ear: Tympanic membrane, ear canal and external ear normal.      Left Ear: Tympanic membrane, ear canal and external ear normal.      Nose: Nose normal.      Mouth/Throat:      Mouth: Mucous membranes are moist.      Pharynx: Uvula midline. No oropharyngeal exudate or posterior oropharyngeal erythema. Tonsils: No tonsillar abscesses. Eyes:      General: No scleral icterus. Conjunctiva/sclera: Conjunctivae normal.      Pupils: Pupils are equal, round, and reactive to light. Neck:      Musculoskeletal: Neck supple. Cardiovascular:      Rate and Rhythm: Normal rate and regular rhythm. Pulses: Normal pulses. Dorsalis pedis pulses are 2+ on the right side and 2+ on the left side.         Posterior tibial pulses are 2+ on the right side and 2+ on the left side. Heart sounds: Normal heart sounds. No murmur. No gallop. Pulmonary:      Effort: Pulmonary effort is normal. No respiratory distress. Breath sounds: Normal breath sounds. No decreased breath sounds, wheezing, rhonchi or rales. Musculoskeletal:      Right lower leg: No edema. Left lower leg: No edema. Lymphadenopathy:      Head:      Right side of head: No submandibular or tonsillar adenopathy. Left side of head: No submandibular or tonsillar adenopathy. Cervical: No cervical adenopathy. Upper Body:      Right upper body: No supraclavicular adenopathy. Left upper body: No supraclavicular adenopathy. Skin:     General: Skin is warm and dry. Neurological:      Mental Status: He is alert and oriented to person, place, and time. Psychiatric:         Speech: Speech normal.         ASSESSMENT and PLAN  Diagnoses and all orders for this visit:    1. Essential hypertension  -     amLODIPine (NORVASC) 5 mg tablet; Take 1 Tab by mouth daily. - Changed from Toprol XL 50 mg. Goal of better efficacy of above regarding BP. May need to combine with ACEI in the future. 2. Chronic insomnia  -     eszopiclone (LUNESTA) 2 mg tablet; Take 1 Tab by mouth nightly. Max Daily Amount: 2 mg. - Options considered:  1) Change Remeron to Lunesta. Despite efficacy, continuing Remeron is not desired given significant weight gain. 2) Change Zoloft to evening Paxil. He prefers to avoid a change of Zoloft as he feels this is working very well for him. - We agree to proceed with Option 1. Usage / AE's of lunesta discussed/reviewed. Risks discussed. Follow-up and Dispositions    · Return in about 1 month (around 4/23/2020) for follow-up HTN.

## 2020-04-29 ENCOUNTER — VIRTUAL VISIT (OUTPATIENT)
Dept: INTERNAL MEDICINE CLINIC | Age: 30
End: 2020-04-29

## 2020-04-29 DIAGNOSIS — F51.04 CHRONIC INSOMNIA: Primary | ICD-10-CM

## 2020-04-29 DIAGNOSIS — F32.A ANXIETY AND DEPRESSION: ICD-10-CM

## 2020-04-29 DIAGNOSIS — F41.9 ANXIETY AND DEPRESSION: ICD-10-CM

## 2020-04-29 DIAGNOSIS — I10 ESSENTIAL HYPERTENSION: ICD-10-CM

## 2020-04-29 RX ORDER — AMLODIPINE BESYLATE 5 MG/1
5 TABLET ORAL DAILY
Qty: 30 TAB | Refills: 2 | Status: SHIPPED | OUTPATIENT
Start: 2020-04-29 | End: 2020-06-10 | Stop reason: SDUPTHER

## 2020-04-29 RX ORDER — ESZOPICLONE 3 MG/1
3 TABLET, FILM COATED ORAL
Qty: 30 TAB | Refills: 2 | Status: SHIPPED | OUTPATIENT
Start: 2020-04-29 | End: 2020-06-10 | Stop reason: SDUPTHER

## 2020-04-29 NOTE — PROGRESS NOTES
Tavon Michael is a 30 y.o. male evaluated via telephone on 4/29/2020. Consent:  He and/or health care decision maker is aware that he may receive a bill for this telephone service, depending on his insurance coverage, and has provided verbal consent to proceed: Yes      Documentation:  I communicated with the patient and/or health care decision maker about     1) HTN - tolerating amlodipine 5 mg well and without complaint. He believes that his BP has improved. No more temporal throbbing. He admits that he had been consuming higher amounts of EtOH - states this was a wake-up call to him. He admits to 5-6 beers/night, sometimes more. He stopped this a few days ago. He had some problems but is feeling better now. 2) Chronic insomnia - Lunesta not working consistently. It helps some nights but he still has problems falling and staying asleep other nights. 3) He has lost the weight he has gained. Admits the weight gain helped him look at his lifestyle and EtOH consumption. 4) anxiety and depression - reports still well-controlled with Zoloft and Wellbutrin. Details of this discussion including any medical advice provided:   Diagnoses and all orders for this visit:    1. Chronic insomnia  -     eszopiclone (LUNESTA) 3 mg tablet; Take 1 Tab by mouth nightly. Max Daily Amount: 3 mg.   - Dosage increase.  - Advised that stopping the EtOH will actually promote healthier sleep. I am extremely pleased with what he has done in regard to his EtOH consumption. 2. Essential hypertension  -     amLODIPine (NORVASC) 5 mg tablet; Take 1 Tab by mouth daily. - Cont same for now. With d/c of EtOH, may be able to reduce or d/c in the future. Will monitor. 3. Anxiety and depression  - Cont Zoloft and Wellbutrin. Follow-up and Dispositions    · Return in about 1 month (around 5/29/2020) for follow up.            I affirm this is a Patient Initiated Episode with an Established Patient who has not had a related appointment within my department in the past 7 days or scheduled within the next 24 hours.     Total Time: minutes: 11-20 minutes    Note: not billable if this call serves to triage the patient into an appointment for the relevant concern      Jass ASHVIN Moy

## 2020-06-10 ENCOUNTER — OFFICE VISIT (OUTPATIENT)
Dept: INTERNAL MEDICINE CLINIC | Age: 30
End: 2020-06-10

## 2020-06-10 VITALS
SYSTOLIC BLOOD PRESSURE: 136 MMHG | HEART RATE: 81 BPM | HEIGHT: 69 IN | DIASTOLIC BLOOD PRESSURE: 98 MMHG | WEIGHT: 207 LBS | BODY MASS INDEX: 30.66 KG/M2 | RESPIRATION RATE: 20 BRPM | TEMPERATURE: 96.9 F | OXYGEN SATURATION: 97 %

## 2020-06-10 DIAGNOSIS — F32.A ANXIETY AND DEPRESSION: ICD-10-CM

## 2020-06-10 DIAGNOSIS — F51.04 CHRONIC INSOMNIA: ICD-10-CM

## 2020-06-10 DIAGNOSIS — F41.9 ANXIETY AND DEPRESSION: ICD-10-CM

## 2020-06-10 DIAGNOSIS — I10 ESSENTIAL HYPERTENSION: Primary | ICD-10-CM

## 2020-06-10 RX ORDER — SERTRALINE HYDROCHLORIDE 100 MG/1
150 TABLET, FILM COATED ORAL DAILY
Qty: 45 TAB | Refills: 11 | Status: SHIPPED | OUTPATIENT
Start: 2020-06-10 | End: 2021-08-04 | Stop reason: ALTCHOICE

## 2020-06-10 RX ORDER — ESZOPICLONE 3 MG/1
3 TABLET, FILM COATED ORAL
Qty: 30 TAB | Refills: 2 | Status: SHIPPED | OUTPATIENT
Start: 2020-06-10 | End: 2020-10-16 | Stop reason: SDUPTHER

## 2020-06-10 RX ORDER — AMLODIPINE BESYLATE 5 MG/1
5 TABLET ORAL DAILY
Qty: 30 TAB | Refills: 11 | Status: SHIPPED | OUTPATIENT
Start: 2020-06-10 | End: 2020-09-04 | Stop reason: ALTCHOICE

## 2020-06-10 NOTE — PROGRESS NOTES
HISTORY OF PRESENT ILLNESS  Alea Yost is a 30 y.o. male. HPI  Presents for f/u.     1) HTN - BP elevated in the office today. No home checks. He feels the temporal throbbing he noted previously is better. No AE with amlodipine.   - EtOH consumption is still down. Admits to ~3 beers/day. 2) Anxiety and depression - feels anxiety could be better controlled. He would be agreeable to trying a dosage increase of Zoloft. 3) Insomnia - Lunesta helping. Admits still has some sleeping problems, but he prefers Lunesta to Trazodone. Remeron worked well, but he gained significant weight. Review of Systems   Respiratory: Negative for shortness of breath. Cardiovascular: Negative for chest pain and leg swelling. Neurological: Negative for dizziness and headaches. Psychiatric/Behavioral: Positive for depression (controlled). The patient is nervous/anxious (controlled) and has insomnia (but tolerable). Visit Vitals  BP (!) 136/98   Pulse 81   Temp 96.9 °F (36.1 °C) (Oral)   Resp 20   Ht 5' 9\" (1.753 m)   Wt 207 lb (93.9 kg)   SpO2 97%   BMI 30.57 kg/m²     Wt Readings from Last 3 Encounters:   06/10/20 207 lb (93.9 kg)   03/23/20 212 lb (96.2 kg)   02/21/20 198 lb (89.8 kg)     BP Readings from Last 3 Encounters:   06/10/20 (!) 136/98   03/23/20 146/86   02/21/20 (!) 146/94       Physical Exam  Constitutional:       General: He is not in acute distress. Appearance: Normal appearance. He is well-developed. HENT:      Head: Normocephalic and atraumatic. Right Ear: Tympanic membrane, ear canal and external ear normal.      Left Ear: Tympanic membrane, ear canal and external ear normal.      Nose: Nose normal.      Mouth/Throat:      Mouth: Mucous membranes are moist.   Eyes:      General: No scleral icterus. Conjunctiva/sclera: Conjunctivae normal.      Pupils: Pupils are equal, round, and reactive to light. Neck:      Musculoskeletal: Neck supple.    Cardiovascular:      Rate and Rhythm: Normal rate and regular rhythm. Pulses: Normal pulses. Dorsalis pedis pulses are 2+ on the right side and 2+ on the left side. Posterior tibial pulses are 2+ on the right side and 2+ on the left side. Heart sounds: Normal heart sounds. No murmur. No gallop. Pulmonary:      Effort: Pulmonary effort is normal. No respiratory distress. Breath sounds: Normal breath sounds. No decreased breath sounds, wheezing, rhonchi or rales. Musculoskeletal:      Right lower leg: No edema. Left lower leg: No edema. Lymphadenopathy:      Head:      Right side of head: No submandibular or tonsillar adenopathy. Left side of head: No submandibular or tonsillar adenopathy. Cervical: No cervical adenopathy. Upper Body:      Right upper body: No supraclavicular adenopathy. Left upper body: No supraclavicular adenopathy. Skin:     General: Skin is warm and dry. Neurological:      Mental Status: He is alert and oriented to person, place, and time. Psychiatric:         Speech: Speech normal.         ASSESSMENT and PLAN  Diagnoses and all orders for this visit:    1. Essential hypertension  -     [facility] BP FLOWSHEET  -     amLODIPine (NORVASC) 5 mg tablet; Take 1 Tab by mouth daily.  - Patient agreeable to purchasing a BP cuff and checking home BP's. I have some suspicion of a white coat component to his in-office BP readings. 2. Anxiety and depression  -     sertraline (ZOLOFT) 100 mg tablet; Take 1.5 Tabs by mouth daily.   - Trial dosage increase to 150 mg daily. If tolerated poorly, resume 100 mg daily dosage. 3. Chronic insomnia  -     eszopiclone (LUNESTA) 3 mg tablet; Take 1 Tab by mouth nightly. Max Daily Amount: 3 mg.   - Cont same for now. Follow-up and Dispositions    · Return in about 6 weeks (around 7/22/2020) for follow-up (in office - BP) .

## 2020-06-10 NOTE — PROGRESS NOTES
Alba Florez is a 30 y.o. male (: 1990) presenting to address:    Chief Complaint   Patient presents with    Anxiety    Hypertension    Depression    Insomnia       Vitals:    06/10/20 1008 06/10/20 1012   BP: (!) 138/100 (!) 136/98   Pulse: 81    Resp: 20    Temp: 96.9 °F (36.1 °C)    TempSrc: Oral    SpO2: 97%    Weight: 207 lb (93.9 kg)    Height: 5' 9\" (1.753 m)    PainSc:   0 - No pain        Hearing/Vision:   No exam data present    Learning Assessment:     Learning Assessment 2018   PRIMARY LEARNER Patient   HIGHEST LEVEL OF EDUCATION - PRIMARY LEARNER  SOME COLLEGE   BARRIERS PRIMARY LEARNER NONE   CO-LEARNER CAREGIVER No   PRIMARY LANGUAGE ENGLISH   LEARNER PREFERENCE PRIMARY DEMONSTRATION   ANSWERED BY Patient   RELATIONSHIP SELF     Depression Screening:     3 most recent PHQ Screens 4/3/2019   Little interest or pleasure in doing things Not at all   Feeling down, depressed, irritable, or hopeless Several days   Total Score PHQ 2 1     Fall Risk Assessment:   No flowsheet data found. Abuse Screening:   No flowsheet data found. Coordination of Care Questionaire:   1. Have you been to the ER, urgent care clinic since your last visit? Hospitalized since your last visit? NO    2. Have you seen or consulted any other health care providers outside of the [address] [place] [address] since your last visit? Include any pap smears or colon screening. NO    Advanced Directive:   1. Do you have an Advanced Directive? NO    2. Would you like information on Advanced Directives?  NO

## 2020-07-24 ENCOUNTER — OFFICE VISIT (OUTPATIENT)
Dept: INTERNAL MEDICINE CLINIC | Age: 30
End: 2020-07-24

## 2020-07-24 VITALS
WEIGHT: 207.6 LBS | HEART RATE: 71 BPM | TEMPERATURE: 97.1 F | SYSTOLIC BLOOD PRESSURE: 146 MMHG | BODY MASS INDEX: 30.75 KG/M2 | OXYGEN SATURATION: 97 % | RESPIRATION RATE: 18 BRPM | HEIGHT: 69 IN | DIASTOLIC BLOOD PRESSURE: 98 MMHG

## 2020-07-24 DIAGNOSIS — F32.A ANXIETY AND DEPRESSION: ICD-10-CM

## 2020-07-24 DIAGNOSIS — F51.04 CHRONIC INSOMNIA: ICD-10-CM

## 2020-07-24 DIAGNOSIS — F41.9 ANXIETY AND DEPRESSION: ICD-10-CM

## 2020-07-24 DIAGNOSIS — I10 ESSENTIAL HYPERTENSION: Primary | ICD-10-CM

## 2020-07-24 NOTE — PROGRESS NOTES
HISTORY OF PRESENT ILLNESS  Eula Boone is a 30 y.o. male. HPI  Routine f/u.    1) HTN - BP elevated. He purchased his home cuff yesterday and hasn't yet checked any home readings. He admits he has been traveling a lot for work and has not been exercising or eating well. - EtOH consumption remains lower around 3 beers/day. 2) Anxiety and depression - dosage increase of Sertraline last visit to 150 mg. He appreciates this change and desires to continue. 3) Chronic insomnia - Lunesta 3 mg nightly. For the most part, this is working well. -  with no aberrancies. Current Outpatient Medications   Medication Sig Dispense Refill    sertraline (ZOLOFT) 100 mg tablet Take 1.5 Tabs by mouth daily. 45 Tab 11    eszopiclone (LUNESTA) 3 mg tablet Take 1 Tab by mouth nightly. Max Daily Amount: 3 mg. 30 Tab 2    amLODIPine (NORVASC) 5 mg tablet Take 1 Tab by mouth daily. 30 Tab 11    buPROPion XL (WELLBUTRIN XL) 150 mg tablet TAKE 1 TABLET BY MOUTH DAILY IN THE MORNING 30 Tab 11         Review of Systems   Cardiovascular: Negative for leg swelling. Neurological: Negative for dizziness and headaches. Visit Vitals  BP (!) 146/98 (BP 1 Location: Left arm, BP Patient Position: Sitting) Comment: manual   Pulse 71   Temp 97.1 °F (36.2 °C) (Oral)   Resp 18   Ht 5' 9\" (1.753 m)   Wt 207 lb 9.6 oz (94.2 kg)   SpO2 97%   BMI 30.66 kg/m²       Physical Exam  Constitutional:       General: He is not in acute distress. Appearance: Normal appearance. He is well-developed. HENT:      Head: Normocephalic and atraumatic. Right Ear: Tympanic membrane, ear canal and external ear normal.      Left Ear: Tympanic membrane, ear canal and external ear normal.      Nose: Nose normal.      Mouth/Throat:      Comments: Mask  Eyes:      General: No scleral icterus. Conjunctiva/sclera: Conjunctivae normal.      Pupils: Pupils are equal, round, and reactive to light. Neck:      Musculoskeletal: Neck supple. Cardiovascular:      Rate and Rhythm: Normal rate and regular rhythm. Pulses: Normal pulses. Dorsalis pedis pulses are 2+ on the right side and 2+ on the left side. Posterior tibial pulses are 2+ on the right side and 2+ on the left side. Heart sounds: Normal heart sounds. No murmur. No gallop. Pulmonary:      Effort: Pulmonary effort is normal. No respiratory distress. Breath sounds: Normal breath sounds. No decreased breath sounds, wheezing, rhonchi or rales. Musculoskeletal:      Right lower leg: No edema. Left lower leg: No edema. Lymphadenopathy:      Head:      Right side of head: No submandibular or tonsillar adenopathy. Left side of head: No submandibular or tonsillar adenopathy. Cervical: No cervical adenopathy. Upper Body:      Right upper body: No supraclavicular adenopathy. Left upper body: No supraclavicular adenopathy. Skin:     General: Skin is warm and dry. Neurological:      Mental Status: He is alert and oriented to person, place, and time. Psychiatric:         Speech: Speech normal.         ASSESSMENT and PLAN  Diagnoses and all orders for this visit:    1. Essential hypertension  - We agree to hold on a med change today. He very much desires to resume his healthier lifestyle. He will also check home readings and send me these via My Chart. - If additional therapy is needed, we discussed next step of either ACEI/ARB or HCTZ. I would prefer to avoid a dosage increase of amlodipine due to concern for AE.   - Cont to avoid excessive EtOH. 2. Anxiety and depression  - Cont Zoloft 150 mg daily. 3. Chronic insomnia  - Cont Lunesta. Follow-up and Dispositions    · Return in about 6 weeks (around 9/4/2020) for follow-up HTN with lab (in office).

## 2020-07-24 NOTE — PROGRESS NOTES
Symone Epps is a 30 y.o. male (: 1990) presenting to address:    Chief Complaint   Patient presents with    Hypertension    Medication Evaluation       Vitals:    20 0859 20 0902   BP: (!) 149/99 (!) 146/98   Pulse: 71    Resp: 18    Temp: 97.1 °F (36.2 °C)    TempSrc: Oral    SpO2: 97%    Weight: 207 lb 9.6 oz (94.2 kg)    Height: 5' 9\" (1.753 m)    PainSc:   0 - No pain        Hearing/Vision:   No exam data present    Learning Assessment:     Learning Assessment 2018   PRIMARY LEARNER Patient   HIGHEST LEVEL OF EDUCATION - PRIMARY LEARNER  SOME COLLEGE   BARRIERS PRIMARY LEARNER NONE   CO-LEARNER CAREGIVER No   PRIMARY LANGUAGE ENGLISH   LEARNER PREFERENCE PRIMARY DEMONSTRATION   ANSWERED BY Patient   RELATIONSHIP SELF     Depression Screening:     3 most recent PHQ Screens 4/3/2019   Little interest or pleasure in doing things Not at all   Feeling down, depressed, irritable, or hopeless Several days   Total Score PHQ 2 1     Fall Risk Assessment:   No flowsheet data found. Abuse Screening:   No flowsheet data found. Coordination of Care Questionaire:   1. Have you been to the ER, urgent care clinic since your last visit? Hospitalized since your last visit? NO    2. Have you seen or consulted any other health care providers outside of the [address] [place] [address] since your last visit? Include any pap smears or colon screening. NO    Advanced Directive:   1. Do you have an Advanced Directive? NO    2. Would you like information on Advanced Directives?  NO

## 2020-09-04 ENCOUNTER — OFFICE VISIT (OUTPATIENT)
Dept: INTERNAL MEDICINE CLINIC | Age: 30
End: 2020-09-04

## 2020-09-04 ENCOUNTER — HOSPITAL ENCOUNTER (OUTPATIENT)
Dept: LAB | Age: 30
Discharge: HOME | End: 2020-09-04

## 2020-09-04 VITALS
RESPIRATION RATE: 18 BRPM | TEMPERATURE: 97.8 F | DIASTOLIC BLOOD PRESSURE: 98 MMHG | BODY MASS INDEX: 30.78 KG/M2 | WEIGHT: 207.8 LBS | SYSTOLIC BLOOD PRESSURE: 146 MMHG | HEART RATE: 78 BPM | OXYGEN SATURATION: 96 % | HEIGHT: 69 IN

## 2020-09-04 DIAGNOSIS — I10 ESSENTIAL HYPERTENSION: ICD-10-CM

## 2020-09-04 DIAGNOSIS — I10 ESSENTIAL HYPERTENSION: Primary | ICD-10-CM

## 2020-09-04 LAB
ALBUMIN SERPL-MCNC: 4.4 G/DL (ref 3.4–5)
ALBUMIN/GLOB SERPL: 1.3 {RATIO} (ref 0.8–1.7)
ALP SERPL-CCNC: 58 U/L (ref 45–117)
ALT SERPL-CCNC: 58 U/L (ref 16–61)
ANION GAP SERPL CALC-SCNC: 7 MMOL/L (ref 3–18)
AST SERPL-CCNC: 29 U/L (ref 10–38)
BILIRUB SERPL-MCNC: 0.8 MG/DL (ref 0.2–1)
BUN SERPL-MCNC: 16 MG/DL (ref 7–18)
BUN/CREAT SERPL: 15 (ref 12–20)
CALCIUM SERPL-MCNC: 9.5 MG/DL (ref 8.5–10.1)
CHLORIDE SERPL-SCNC: 106 MMOL/L (ref 100–111)
CO2 SERPL-SCNC: 27 MMOL/L (ref 21–32)
CREAT SERPL-MCNC: 1.07 MG/DL (ref 0.6–1.3)
GLOBULIN SER CALC-MCNC: 3.3 G/DL (ref 2–4)
GLUCOSE SERPL-MCNC: 89 MG/DL (ref 74–99)
POTASSIUM SERPL-SCNC: 4.3 MMOL/L (ref 3.5–5.5)
PROT SERPL-MCNC: 7.7 G/DL (ref 6.4–8.2)
SODIUM SERPL-SCNC: 140 MMOL/L (ref 136–145)

## 2020-09-04 PROCEDURE — 80053 COMPREHEN METABOLIC PANEL: CPT

## 2020-09-04 RX ORDER — AMLODIPINE AND BENAZEPRIL HYDROCHLORIDE 5; 20 MG/1; MG/1
1 CAPSULE ORAL DAILY
Qty: 30 CAP | Refills: 5 | Status: SHIPPED | OUTPATIENT
Start: 2020-09-04 | End: 2021-04-23

## 2020-09-04 NOTE — PROGRESS NOTES
Ary Melara is a 30 y.o. male (: 1990) presenting to address:    Chief Complaint   Patient presents with    Hypertension     f/u       Vitals:    20 0827 20 0829   BP: (!) 138/100 (!) 140/98   Pulse: 78    Resp: 18    Temp: 97.8 °F (36.6 °C)    TempSrc: Oral    SpO2: 96%    Weight: 207 lb 12.8 oz (94.3 kg)    Height: 5' 9\" (1.753 m)    PainSc:   0 - No pain        Hearing/Vision:   No exam data present    Learning Assessment:     Learning Assessment 2018   PRIMARY LEARNER Patient   HIGHEST LEVEL OF EDUCATION - PRIMARY LEARNER  SOME COLLEGE   BARRIERS PRIMARY LEARNER NONE   CO-LEARNER CAREGIVER No   PRIMARY LANGUAGE ENGLISH   LEARNER PREFERENCE PRIMARY DEMONSTRATION   ANSWERED BY Patient   RELATIONSHIP SELF     Depression Screening:     3 most recent PHQ Screens 4/3/2019   Little interest or pleasure in doing things Not at all   Feeling down, depressed, irritable, or hopeless Several days   Total Score PHQ 2 1     Fall Risk Assessment:   No flowsheet data found. Abuse Screening:   No flowsheet data found. Coordination of Care Questionaire:   1. Have you been to the ER, urgent care clinic since your last visit? Hospitalized since your last visit? NO    2. Have you seen or consulted any other health care providers outside of the [address] [place] [address] since your last visit? Include any pap smears or colon screening. NO    Advanced Directive:   1. Do you have an Advanced Directive? NO    2. Would you like information on Advanced Directives?  NO

## 2020-09-04 NOTE — PROGRESS NOTES
HISTORY OF PRESENT ILLNESS  Claribel Schilling is a 30 y.o. male. HPI  F/u Htn. Taking amlodipine daily. Has resumed healthier lifestyle. He is consuming very little alcohol. Drinking seltzer water to help with cravings. Review of Systems   Constitutional:        Feeling well. Visit Vitals  BP (!) 146/98 Comment: L manual   Pulse 78   Temp 97.8 °F (36.6 °C) (Oral)   Resp 18   Ht 5' 9\" (1.753 m)   Wt 207 lb 12.8 oz (94.3 kg)   SpO2 96%   BMI 30.69 kg/m²     Wt Readings from Last 3 Encounters:   09/04/20 207 lb 12.8 oz (94.3 kg)   07/24/20 207 lb 9.6 oz (94.2 kg)   06/10/20 207 lb (93.9 kg)     BP Readings from Last 3 Encounters:   09/04/20 (!) 146/98   07/24/20 (!) 146/98   06/10/20 (!) 136/98       Physical Exam  Constitutional:       General: He is not in acute distress. Appearance: Normal appearance. He is well-developed. HENT:      Head: Normocephalic and atraumatic. Right Ear: Tympanic membrane, ear canal and external ear normal.      Left Ear: Tympanic membrane, ear canal and external ear normal.      Nose: Nose normal.      Mouth/Throat:      Comments: Mask  Eyes:      General: No scleral icterus. Conjunctiva/sclera: Conjunctivae normal.      Pupils: Pupils are equal, round, and reactive to light. Neck:      Musculoskeletal: Neck supple. Cardiovascular:      Rate and Rhythm: Normal rate and regular rhythm. Pulses: Normal pulses. Dorsalis pedis pulses are 2+ on the right side and 2+ on the left side. Posterior tibial pulses are 2+ on the right side and 2+ on the left side. Heart sounds: Normal heart sounds. No murmur. No gallop. Pulmonary:      Effort: Pulmonary effort is normal. No respiratory distress. Breath sounds: Normal breath sounds. No decreased breath sounds, wheezing, rhonchi or rales. Musculoskeletal:      Right lower leg: No edema. Left lower leg: No edema.    Lymphadenopathy:      Head:      Right side of head: No submandibular or tonsillar adenopathy. Left side of head: No submandibular or tonsillar adenopathy. Cervical: No cervical adenopathy. Upper Body:      Right upper body: No supraclavicular adenopathy. Left upper body: No supraclavicular adenopathy. Skin:     General: Skin is warm and dry. Neurological:      Mental Status: He is alert and oriented to person, place, and time. Psychiatric:         Speech: Speech normal.         ASSESSMENT and PLAN  Diagnoses and all orders for this visit:    1. Essential hypertension  -     amLODIPine-benazepril (LOTREL) 5-20 mg per capsule; Take 1 Cap by mouth daily. - Changed from Amlodipine 5 mg daily.   - Usage / AE's discussed. With onset rash, patient is to discontinue medication and return to care. With facial/throat swelling, patient to present immediately to ER. Patient aware and agrees. -     METABOLIC PANEL, COMPREHENSIVE; Future      Follow-up and Dispositions    · Return in about 6 weeks (around 10/16/2020), or f/u HTN.

## 2020-10-09 NOTE — PROGRESS NOTES
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 - Malignant neoplasm of rectum,  Diagnosed 2016  (Active)    Treatment Name: *Capecitabine-1250    Toxicities:  The following toxicities were assessed as a 2:  Palmar-plantar erythrodys synd - Skin changes (e.g., peeling, blisters, bleeding, edema, or hyperkeratosis) with pain; limiting instrumental ADL, pt using lotions and bandaids    The following toxicities were assessed as a 1:  Depression - Mild depressive symptoms, emotional support offered  Fatigue - Fatigue relieved by rest    Vital Signs: Performed on 2018 09:46  Height - 182.00 cms   Weight - 66.5 kg (LOW) shoes off   BSA - 1.86 sq.m   BMI - 20.0800   Temperature - 96.5 F   Pulse - 102 /min   Respiration - 18 /min   BP - 119/71 mm(hg)   Pain - 0    Allergies: No Known Allergies.  Allergies reviewed    Medication List:Centrum Silver Ultra Womens  GlucosamineWellbutrin  The Medication List was reviewed.    Current Labs:     Test performed on 2018 08:25  WBC - 4.3   RBC - 2.86   Hgb - 10.4   HCT - 32.6   MCV - 114.0   MCH - 36.4   MCHC - 31.9   RDW- CV - 16.2   Platelet Count - 169   Diff Type - AUTOMATED DIFFERENTIAL   Neutrophils - 3.5   Lymphocytes - 0.4   Mid Cells - 0.4   Neutrophil % - 81   Lymphocyte % - 10   Mid Cells % - 9    Labs were reviewed with the patient.       Psychosocial assessment:    Choose one:  No new psychosocial concerns      Oral Therapy Start Date:  3/13/18 next cycle 18           Contraceptive Review:   NA: Patient is post menopausal         Adherence Assessment:      Do you ever forget to take your medication    _____Yes  __x___No If yes, please explain:  Did you skip your medication for any reason _____Yes  __x___No If yes, please explain:  Sometimes if you feel worse when you take the medication, do you stop taking it        _____Yes  __x___No If  Pt contacted at home number. 2 pt identifiers confirmed. Pt informed of below. Pt verbalized understanding. No other questions at this time. yes, please explain:  When you feel better do you sometimes stop taking your medication        _____Yes  __x___No If yes, please explain:  Adherence Evaluation: (1 point for each yes; High adherence =0, Medium adherence  =1-2, Low adherence =3-4)      Score:______0___________    Calendar/Diary Returned: _____Yes  _____No __x___NA    Nursing Note:    S:O:  Pt seen by Dr. Barajas today.  Pt reports continued PPE, stable, she is using lotion liberally and covers open areas with bandages.  Gait unsteady,  pt admits she should use a walker but doesn't.  Discussed importance of using walking aids to avoid falls.  Pt states she understands but chooses not to use a walker anyway.  Advised pt that she can get PT to help her with gait training, strength.  PT could be close to her home.   Pt declined, states she will think about it.  New rx sent to pts pharmacy.      A: Patient is able to explain the medication schedule and verbalizes taking medication as prescribed.   Adherent with schedule     P: Return as scheduled.        Reviewed side effects, drug/food interactions,  discharge instructions including doses and administration instructions, adherence plan, safe handling of the medication and labs with patient.   Pt reported understanding.     _____Yes  _____No If no, please explain:    Questions were answered and understanding was verbalized.    Electronically signed byShanta

## 2020-10-16 ENCOUNTER — OFFICE VISIT (OUTPATIENT)
Dept: INTERNAL MEDICINE CLINIC | Age: 30
End: 2020-10-16

## 2020-10-16 ENCOUNTER — HOSPITAL ENCOUNTER (OUTPATIENT)
Dept: LAB | Age: 30
Discharge: HOME | End: 2020-10-16

## 2020-10-16 VITALS
WEIGHT: 202 LBS | DIASTOLIC BLOOD PRESSURE: 72 MMHG | HEIGHT: 69 IN | SYSTOLIC BLOOD PRESSURE: 126 MMHG | HEART RATE: 63 BPM | OXYGEN SATURATION: 95 % | BODY MASS INDEX: 29.92 KG/M2 | TEMPERATURE: 97.3 F | RESPIRATION RATE: 29 BRPM

## 2020-10-16 DIAGNOSIS — I10 ESSENTIAL HYPERTENSION: Primary | ICD-10-CM

## 2020-10-16 DIAGNOSIS — F32.A ANXIETY AND DEPRESSION: ICD-10-CM

## 2020-10-16 DIAGNOSIS — Z23 NEEDS FLU SHOT: ICD-10-CM

## 2020-10-16 DIAGNOSIS — F51.04 CHRONIC INSOMNIA: ICD-10-CM

## 2020-10-16 DIAGNOSIS — F41.9 ANXIETY AND DEPRESSION: ICD-10-CM

## 2020-10-16 LAB
ALBUMIN SERPL-MCNC: 4.3 G/DL (ref 3.4–5)
ALBUMIN/GLOB SERPL: 1.4 {RATIO} (ref 0.8–1.7)
ALP SERPL-CCNC: 61 U/L (ref 45–117)
ALT SERPL-CCNC: 36 U/L (ref 16–61)
ANION GAP SERPL CALC-SCNC: 6 MMOL/L (ref 3–18)
AST SERPL-CCNC: 16 U/L (ref 10–38)
BILIRUB SERPL-MCNC: 0.6 MG/DL (ref 0.2–1)
BUN SERPL-MCNC: 15 MG/DL (ref 7–18)
BUN/CREAT SERPL: 16 (ref 12–20)
CALCIUM SERPL-MCNC: 9.3 MG/DL (ref 8.5–10.1)
CHLORIDE SERPL-SCNC: 107 MMOL/L (ref 100–111)
CO2 SERPL-SCNC: 29 MMOL/L (ref 21–32)
CREAT SERPL-MCNC: 0.94 MG/DL (ref 0.6–1.3)
GLOBULIN SER CALC-MCNC: 3.1 G/DL (ref 2–4)
GLUCOSE SERPL-MCNC: 87 MG/DL (ref 74–99)
POTASSIUM SERPL-SCNC: 4.3 MMOL/L (ref 3.5–5.5)
PROT SERPL-MCNC: 7.4 G/DL (ref 6.4–8.2)
SODIUM SERPL-SCNC: 142 MMOL/L (ref 136–145)

## 2020-10-16 PROCEDURE — 36415 COLL VENOUS BLD VENIPUNCTURE: CPT

## 2020-10-16 PROCEDURE — 90686 IIV4 VACC NO PRSV 0.5 ML IM: CPT | Performed by: PHYSICIAN ASSISTANT

## 2020-10-16 PROCEDURE — 99213 OFFICE O/P EST LOW 20 MIN: CPT | Performed by: PHYSICIAN ASSISTANT

## 2020-10-16 PROCEDURE — 90471 IMMUNIZATION ADMIN: CPT | Performed by: PHYSICIAN ASSISTANT

## 2020-10-16 PROCEDURE — 80053 COMPREHEN METABOLIC PANEL: CPT

## 2020-10-16 RX ORDER — BUPROPION HYDROCHLORIDE 150 MG/1
TABLET ORAL
Qty: 30 TAB | Refills: 11 | Status: SHIPPED | OUTPATIENT
Start: 2020-10-16 | End: 2021-01-18 | Stop reason: ALTCHOICE

## 2020-10-16 RX ORDER — ESZOPICLONE 3 MG/1
3 TABLET, FILM COATED ORAL
Qty: 30 TAB | Refills: 2 | Status: SHIPPED | OUTPATIENT
Start: 2020-10-16 | End: 2021-04-23 | Stop reason: SDUPTHER

## 2020-10-16 NOTE — PROGRESS NOTES
Misbah Chery is a 30 y.o. male (: 1990) presenting to address:    Chief Complaint   Patient presents with    Hypertension     f/u       Vitals:    10/16/20 0833   BP: 132/87   Pulse: 63   Resp: 29   Temp: 97.3 °F (36.3 °C)   TempSrc: Oral   SpO2: 95%   Weight: 202 lb (91.6 kg)   Height: 5' 9\" (1.753 m)   PainSc:   0 - No pain       Hearing/Vision:   No exam data present    Learning Assessment:     Learning Assessment 2018   PRIMARY LEARNER Patient   HIGHEST LEVEL OF EDUCATION - PRIMARY LEARNER  SOME COLLEGE   BARRIERS PRIMARY LEARNER NONE   CO-LEARNER CAREGIVER No   PRIMARY LANGUAGE ENGLISH   LEARNER PREFERENCE PRIMARY DEMONSTRATION   ANSWERED BY Patient   RELATIONSHIP SELF     Depression Screening:     3 most recent PHQ Screens 4/3/2019   Little interest or pleasure in doing things Not at all   Feeling down, depressed, irritable, or hopeless Several days   Total Score PHQ 2 1     Fall Risk Assessment:   No flowsheet data found. Abuse Screening:   No flowsheet data found. Coordination of Care Questionaire:   1. Have you been to the ER, urgent care clinic since your last visit? Hospitalized since your last visit? NO    2. Have you seen or consulted any other health care providers outside of the [address] [place] [address] since your last visit? Include any pap smears or colon screening. NO    Advanced Directive:   1. Do you have an Advanced Directive? NO    2. Would you like information on Advanced Directives?  NO

## 2020-10-16 NOTE — PROGRESS NOTES
HISTORY OF PRESENT ILLNESS  Cha Waite is a 30 y.o. male. HPI  Presents for f/u HTN. BP well-controlled/much improved today. Tolerating Lotrel well without complaint. Reports his diet/exercise regimen are going well. Reports EtOH consumption is low. 2) Depression and anxiety - controlled/stable per his report. 3) Insomnia - reasonably controlled with Lunesta. Current Outpatient Medications   Medication Sig Dispense Refill    buPROPion XL (WELLBUTRIN XL) 150 mg tablet TAKE 1 TABLET BY MOUTH DAILY IN THE MORNING 30 Tab 11    eszopiclone (LUNESTA) 3 mg tablet Take 1 Tab by mouth nightly. Max Daily Amount: 3 mg. 30 Tab 2    amLODIPine-benazepril (LOTREL) 5-20 mg per capsule Take 1 Cap by mouth daily. 30 Cap 5    sertraline (ZOLOFT) 100 mg tablet Take 1.5 Tabs by mouth daily. 45 Tab 11     No visits with results within 6 Week(s) from this visit. Latest known visit with results is:   Hospital Outpatient Visit on 09/04/2020   Component Date Value Ref Range Status    Sodium 09/04/2020 140  136 - 145 mmol/L Final    Potassium 09/04/2020 4.3  3.5 - 5.5 mmol/L Final    Chloride 09/04/2020 106  100 - 111 mmol/L Final    CO2 09/04/2020 27  21 - 32 mmol/L Final    Anion gap 09/04/2020 7  3.0 - 18 mmol/L Final    Glucose 09/04/2020 89  74 - 99 mg/dL Final    BUN 09/04/2020 16  7.0 - 18 MG/DL Final    Creatinine 09/04/2020 1.07  0.6 - 1.3 MG/DL Final    BUN/Creatinine ratio 09/04/2020 15  12 - 20   Final    GFR est AA 09/04/2020 >60  >60 ml/min/1.73m2 Final    GFR est non-AA 09/04/2020 >60  >60 ml/min/1.73m2 Final    Comment: (NOTE)  Estimated GFR is calculated using the Modification of Diet in Renal   Disease (MDRD) Study equation, reported for both  Americans   (GFRAA) and non- Americans (GFRNA), and normalized to 1.73m2   body surface area. The physician must decide which value applies to   the patient.  The MDRD study equation should only be used in   individuals age 25 or older. It has not been validated for the   following: pregnant women, patients with serious comorbid conditions,   or on certain medications, or persons with extremes of body size,   muscle mass, or nutritional status.  Calcium 09/04/2020 9.5  8.5 - 10.1 MG/DL Final    Bilirubin, total 09/04/2020 0.8  0.2 - 1.0 MG/DL Final    ALT (SGPT) 09/04/2020 58  16 - 61 U/L Final    AST (SGOT) 09/04/2020 29  10 - 38 U/L Final    Alk. phosphatase 09/04/2020 58  45 - 117 U/L Final    Protein, total 09/04/2020 7.7  6.4 - 8.2 g/dL Final    Albumin 09/04/2020 4.4  3.4 - 5.0 g/dL Final    Globulin 09/04/2020 3.3  2.0 - 4.0 g/dL Final    A-G Ratio 09/04/2020 1.3  0.8 - 1.7   Final         Review of Systems   Respiratory: Negative for cough. Skin: Negative for rash. Neurological: Negative for dizziness and headaches. Psychiatric/Behavioral: The patient has insomnia (stable; controlled). Visit Vitals  /72 Comment: L manual   Pulse 63   Temp 97.3 °F (36.3 °C) (Oral)   Resp 29   Ht 5' 9\" (1.753 m)   Wt 202 lb (91.6 kg)   SpO2 95%   BMI 29.83 kg/m²       Physical Exam  Constitutional:       General: He is not in acute distress. Appearance: Normal appearance. He is well-developed. HENT:      Head: Normocephalic and atraumatic. Right Ear: Tympanic membrane, ear canal and external ear normal.      Left Ear: Tympanic membrane, ear canal and external ear normal.      Nose: Nose normal.      Mouth/Throat:      Comments: Mask  Eyes:      General: No scleral icterus. Conjunctiva/sclera: Conjunctivae normal.      Pupils: Pupils are equal, round, and reactive to light. Neck:      Musculoskeletal: Neck supple. Cardiovascular:      Rate and Rhythm: Normal rate and regular rhythm. Pulses: Normal pulses. Dorsalis pedis pulses are 2+ on the right side and 2+ on the left side. Posterior tibial pulses are 2+ on the right side and 2+ on the left side.       Heart sounds: Normal heart sounds. No murmur. No gallop. Pulmonary:      Effort: Pulmonary effort is normal. No respiratory distress. Breath sounds: Normal breath sounds. No decreased breath sounds, wheezing, rhonchi or rales. Musculoskeletal:      Right lower leg: No edema. Left lower leg: No edema. Lymphadenopathy:      Head:      Right side of head: No submandibular or tonsillar adenopathy. Left side of head: No submandibular or tonsillar adenopathy. Cervical: No cervical adenopathy. Upper Body:      Right upper body: No supraclavicular adenopathy. Left upper body: No supraclavicular adenopathy. Skin:     General: Skin is warm and dry. Neurological:      Mental Status: He is alert and oriented to person, place, and time. Psychiatric:         Speech: Speech normal.         ASSESSMENT and PLAN  Diagnoses and all orders for this visit:    1. Essential hypertension  -     METABOLIC PANEL, BASIC; Future  - Extremely pleased with status/response to Lotrel. Cont current therapy. 2. Anxiety and depression  -     buPROPion XL (WELLBUTRIN XL) 150 mg tablet; TAKE 1 TABLET BY MOUTH DAILY IN THE MORNING    3. Chronic insomnia  -     eszopiclone (LUNESTA) 3 mg tablet; Take 1 Tab by mouth nightly. Max Daily Amount: 3 mg.   - Refill. 4. Needs flu shot  -     INFLUENZA VIRUS VAC QUAD,SPLIT,PRESV FREE SYRINGE IM      Follow-up and Dispositions    · Return in about 3 months (around 1/16/2021) for follow-up HTN, depression, anxiety, insomnia.

## 2020-10-16 NOTE — PROGRESS NOTES
Influenza immunization administered left deltoid  10/16/2020 by Noah Reyna LPN with pt's consent. Patient tolerated procedure well. No reactions noted.

## 2020-11-02 ENCOUNTER — OFFICE VISIT (OUTPATIENT)
Dept: INTERNAL MEDICINE CLINIC | Age: 30
End: 2020-11-02
Payer: COMMERCIAL

## 2020-11-02 VITALS
RESPIRATION RATE: 22 BRPM | BODY MASS INDEX: 30.75 KG/M2 | HEIGHT: 69 IN | DIASTOLIC BLOOD PRESSURE: 99 MMHG | WEIGHT: 207.6 LBS | HEART RATE: 70 BPM | OXYGEN SATURATION: 97 % | SYSTOLIC BLOOD PRESSURE: 136 MMHG | TEMPERATURE: 98.3 F

## 2020-11-02 DIAGNOSIS — F41.9 ANXIETY AND DEPRESSION: Primary | ICD-10-CM

## 2020-11-02 DIAGNOSIS — F32.A ANXIETY AND DEPRESSION: Primary | ICD-10-CM

## 2020-11-02 PROCEDURE — 99213 OFFICE O/P EST LOW 20 MIN: CPT | Performed by: PHYSICIAN ASSISTANT

## 2020-11-02 NOTE — PROGRESS NOTES
Megha Reyes is a 26 y.o. male (: 1990) presenting to address:    Chief Complaint   Patient presents with    Medication Problem       Vitals:    20 1250 20 1252   BP: (!) 144/92 (!) 136/99   Pulse: 70    Resp: 22    Temp: 98.3 °F (36.8 °C)    TempSrc: Oral    SpO2: 97%    Weight: 207 lb 9.6 oz (94.2 kg)    Height: 5' 9\" (1.753 m)        Hearing/Vision:   No exam data present    Learning Assessment:     Learning Assessment 2018   PRIMARY LEARNER Patient   HIGHEST LEVEL OF EDUCATION - PRIMARY LEARNER  SOME COLLEGE   BARRIERS PRIMARY LEARNER NONE   CO-LEARNER CAREGIVER No   PRIMARY LANGUAGE ENGLISH   LEARNER PREFERENCE PRIMARY DEMONSTRATION   ANSWERED BY Patient   RELATIONSHIP SELF     Depression Screening:     3 most recent PHQ Screens 4/3/2019   Little interest or pleasure in doing things Not at all   Feeling down, depressed, irritable, or hopeless Several days   Total Score PHQ 2 1     Fall Risk Assessment:   No flowsheet data found. Abuse Screening:   No flowsheet data found. Coordination of Care Questionaire:   1. Have you been to the ER, urgent care clinic since your last visit? Hospitalized since your last visit? NO    2. Have you seen or consulted any other health care providers outside of the [address] [place] [address] since your last visit? Include any pap smears or colon screening. NO    Advanced Directive:   1. Do you have an Advanced Directive? NO    2. Would you like information on Advanced Directives?  NO

## 2021-01-18 ENCOUNTER — OFFICE VISIT (OUTPATIENT)
Dept: INTERNAL MEDICINE CLINIC | Age: 31
End: 2021-01-18
Payer: COMMERCIAL

## 2021-01-18 VITALS
DIASTOLIC BLOOD PRESSURE: 90 MMHG | OXYGEN SATURATION: 96 % | WEIGHT: 208 LBS | HEIGHT: 69 IN | TEMPERATURE: 97.2 F | RESPIRATION RATE: 18 BRPM | BODY MASS INDEX: 30.81 KG/M2 | HEART RATE: 72 BPM | SYSTOLIC BLOOD PRESSURE: 146 MMHG

## 2021-01-18 DIAGNOSIS — F41.9 ANXIETY AND DEPRESSION: ICD-10-CM

## 2021-01-18 DIAGNOSIS — F32.A ANXIETY AND DEPRESSION: ICD-10-CM

## 2021-01-18 DIAGNOSIS — I10 ESSENTIAL HYPERTENSION: Primary | ICD-10-CM

## 2021-01-18 DIAGNOSIS — F51.04 CHRONIC INSOMNIA: ICD-10-CM

## 2021-01-18 PROCEDURE — 99213 OFFICE O/P EST LOW 20 MIN: CPT | Performed by: PHYSICIAN ASSISTANT

## 2021-01-18 NOTE — PROGRESS NOTES
Bruce Wyatt is a 26 y.o. male (: 1990) presenting to address:    Chief Complaint   Patient presents with    Anxiety    Depression    Medication Evaluation       Vitals:    21 1457 21 1500   BP: (!) 148/98 (!) 146/90   Pulse: 72    Resp: 18    Temp: 97.2 °F (36.2 °C)    TempSrc: Oral    SpO2: 96%    Weight: 208 lb (94.3 kg)    Height: 5' 9\" (1.753 m)    PainSc:   0 - No pain        Hearing/Vision:   No exam data present    Learning Assessment:     Learning Assessment 2018   PRIMARY LEARNER Patient   HIGHEST LEVEL OF EDUCATION - PRIMARY LEARNER  SOME COLLEGE   BARRIERS PRIMARY LEARNER NONE   CO-LEARNER CAREGIVER No   PRIMARY LANGUAGE ENGLISH   LEARNER PREFERENCE PRIMARY DEMONSTRATION   ANSWERED BY Patient   RELATIONSHIP SELF     Depression Screening:     3 most recent PHQ Screens 4/3/2019   Little interest or pleasure in doing things Not at all   Feeling down, depressed, irritable, or hopeless Several days   Total Score PHQ 2 1     Fall Risk Assessment:   No flowsheet data found. Abuse Screening:   No flowsheet data found. Coordination of Care Questionaire:   1. Have you been to the ER, urgent care clinic since your last visit? Hospitalized since your last visit? NO    2. Have you seen or consulted any other health care providers outside of the [address] [place] [address] since your last visit? Include any pap smears or colon screening. NO    Advanced Directive:   1. Do you have an Advanced Directive? NO    2. Would you like information on Advanced Directives?  NO

## 2021-01-19 NOTE — PROGRESS NOTES
HISTORY OF PRESENT ILLNESS  Grace Stoddard is a 26 y.o. male. HPI  Presents for routine f/u.    1) HTN - BP more significantly elevated. Admits to an increase of EtOH consumption, increased dietary Na, and no exercise as of late. States taking Lotrel once daily with rare missed dosages. Denies significant NSAID. Nonsmoker. 2) Chronic insomnia - decreased Lunesta to once to twice per week. Reports was experiencing slurred speech, and this improved when he stopped his Salvo Ransom and took it much less frequently. Also reports improved sexual AE's. 3) Anxiety and depression - reasonably controlled with Zoloft 100 mg daily. Admits to a slight increase of symptoms at present, but tolerable. Review of Systems   Respiratory: Negative for shortness of breath. Cardiovascular: Negative for chest pain, palpitations and leg swelling. Neurological: Negative for dizziness and headaches. Visit Vitals  BP (!) 146/90 (BP 1 Location: Left arm, BP Patient Position: Sitting) Comment: manual   Pulse 72   Temp 97.2 °F (36.2 °C) (Oral)   Resp 18   Ht 5' 9\" (1.753 m)   Wt 208 lb (94.3 kg)   SpO2 96%   BMI 30.72 kg/m²     BP Readings from Last 3 Encounters:   01/18/21 (!) 146/90   11/02/20 (!) 136/99   10/16/20 126/72       Physical Exam  Constitutional:       General: He is not in acute distress. Appearance: Normal appearance. He is well-developed. HENT:      Head: Normocephalic and atraumatic. Right Ear: Tympanic membrane, ear canal and external ear normal.      Left Ear: Tympanic membrane, ear canal and external ear normal.      Nose: Nose normal.      Mouth/Throat:      Comments: Mask  Eyes:      General: No scleral icterus. Conjunctiva/sclera: Conjunctivae normal.      Pupils: Pupils are equal, round, and reactive to light. Neck:      Musculoskeletal: Neck supple. Cardiovascular:      Rate and Rhythm: Normal rate and regular rhythm. Pulses: Normal pulses.            Dorsalis pedis pulses are 2+ on the right side and 2+ on the left side. Posterior tibial pulses are 2+ on the right side and 2+ on the left side. Heart sounds: Normal heart sounds. No murmur. No gallop. Pulmonary:      Effort: Pulmonary effort is normal. No respiratory distress. Breath sounds: Normal breath sounds. No decreased breath sounds, wheezing, rhonchi or rales. Musculoskeletal:      Right lower leg: No edema. Left lower leg: No edema. Lymphadenopathy:      Head:      Right side of head: No submandibular or tonsillar adenopathy. Left side of head: No submandibular or tonsillar adenopathy. Cervical: No cervical adenopathy. Upper Body:      Right upper body: No supraclavicular adenopathy. Left upper body: No supraclavicular adenopathy. Skin:     General: Skin is warm and dry. Neurological:      Mental Status: He is alert and oriented to person, place, and time. Psychiatric:         Speech: Speech normal.         ASSESSMENT and PLAN  Diagnoses and all orders for this visit:    1. Essential hypertension  - Will hold on med dosage increase for now. Resume healthier lifestyle. 2. Anxiety and depression  - Cont Zoloft 100 mg daily. Cont routine f/u with counselor. 3. Chronic insomnia  - Pleased with decreased Lunesta use. Cont Zzzquil and melatonin with infrequent Lunesta. Follow-up and Dispositions    · Return in about 6 weeks (around 3/1/2021) for follow-up HTN.

## 2021-02-08 DIAGNOSIS — F51.04 CHRONIC INSOMNIA: Primary | ICD-10-CM

## 2021-02-10 RX ORDER — ESZOPICLONE 2 MG/1
TABLET, FILM COATED ORAL
Qty: 30 TAB | Refills: 0 | Status: SHIPPED | OUTPATIENT
Start: 2021-02-10 | End: 2021-03-12

## 2021-03-01 ENCOUNTER — TELEPHONE (OUTPATIENT)
Dept: INTERNAL MEDICINE CLINIC | Age: 31
End: 2021-03-01

## 2021-03-01 NOTE — TELEPHONE ENCOUNTER
Spoke with patient. He admits to being in a low point. States his therapist wishes to discuss this in regard to his medication. He reports he has signed a release with Dr. Jihan Strauss for her to disclose information. LMOM x 1 with Dr. Jihan Strauss.

## 2021-03-01 NOTE — TELEPHONE ENCOUNTER
Received a call from a Mr. Halima Acharya. No information was disclosed to this individual as I was expecting to hear back from a female Dr. Elfida Mcardle. Please contact patient and advise of the above. I recommend he confirm the number or ask Dr. Elfida Mcardle to contact me.

## 2021-03-01 NOTE — TELEPHONE ENCOUNTER
Patient stated his therapist Dr Jose Juan Ryan is requesting to speak with his PCP.  [facility]

## 2021-03-10 ENCOUNTER — TELEPHONE (OUTPATIENT)
Dept: INTERNAL MEDICINE CLINIC | Age: 31
End: 2021-03-10

## 2021-03-10 DIAGNOSIS — Z86.59 HISTORY OF MOOD DISORDER: ICD-10-CM

## 2021-03-10 DIAGNOSIS — F41.9 ANXIETY AND DEPRESSION: Primary | ICD-10-CM

## 2021-03-10 DIAGNOSIS — F32.A ANXIETY AND DEPRESSION: Primary | ICD-10-CM

## 2021-03-10 NOTE — TELEPHONE ENCOUNTER
Mrs Desiree Castillo would like to speak with you regarding Mr Bertis Shone care please return call when available

## 2021-03-12 NOTE — TELEPHONE ENCOUNTER
Spoke again with Ms. Samina Roberts. Will direct referral to Kj Mendoza of [facility] per Ms. Samina Roberts' recommendation.

## 2021-03-12 NOTE — TELEPHONE ENCOUNTER
Spoke with Ms. Felisa Hayes, patient's counselor. She reports patient has divulged a history of previous diagnoses of borderline DO and bipolar DO. These diagnoses had previous been undisclosed to me. She also reports that in the past few months, patient has seemed to have a significant shift of mood/personality. We agree it would be a good idea to consult with psychiatry for a new updated evaluation. She inquires about a location that could possibly provide genetic testing to assist with medication guidance. She inquires about a mood stabilizer for Mr. Aelx Burciaga, possibly [place]. I would prefer Mr. Alex Burciaga consult with psychiatry for this to be initiated and managed. Spoke with Mr. Alex Burciaga. Discussed mutual recommendation of psychiatry evaluation and assumption of medication management. He is very agreeable. Referral placed.

## 2021-04-11 DIAGNOSIS — F51.04 CHRONIC INSOMNIA: ICD-10-CM

## 2021-04-11 RX ORDER — ESZOPICLONE 2 MG/1
TABLET, FILM COATED ORAL
Qty: 30 TAB | Refills: 2 | Status: SHIPPED
Start: 2021-04-11 | End: 2021-04-23 | Stop reason: DRUGHIGH

## 2021-04-23 ENCOUNTER — OFFICE VISIT (OUTPATIENT)
Dept: INTERNAL MEDICINE CLINIC | Age: 31
End: 2021-04-23
Payer: COMMERCIAL

## 2021-04-23 VITALS
HEART RATE: 75 BPM | WEIGHT: 203 LBS | OXYGEN SATURATION: 97 % | SYSTOLIC BLOOD PRESSURE: 138 MMHG | BODY MASS INDEX: 30.07 KG/M2 | TEMPERATURE: 96.4 F | DIASTOLIC BLOOD PRESSURE: 90 MMHG | RESPIRATION RATE: 20 BRPM | HEIGHT: 69 IN

## 2021-04-23 DIAGNOSIS — I10 ESSENTIAL HYPERTENSION: Primary | ICD-10-CM

## 2021-04-23 DIAGNOSIS — F51.04 CHRONIC INSOMNIA: ICD-10-CM

## 2021-04-23 PROCEDURE — 99214 OFFICE O/P EST MOD 30 MIN: CPT | Performed by: PHYSICIAN ASSISTANT

## 2021-04-23 RX ORDER — AMLODIPINE AND BENAZEPRIL HYDROCHLORIDE 5; 20 MG/1; MG/1
2 CAPSULE ORAL DAILY
Qty: 60 CAP | Refills: 5 | Status: SHIPPED | OUTPATIENT
Start: 2021-04-23 | End: 2021-08-04 | Stop reason: SDUPTHER

## 2021-04-23 RX ORDER — ESZOPICLONE 3 MG/1
3 TABLET, FILM COATED ORAL
Qty: 30 TAB | Refills: 2 | Status: SHIPPED | OUTPATIENT
Start: 2021-04-23 | End: 2021-07-23 | Stop reason: SDUPTHER

## 2021-04-23 NOTE — PROGRESS NOTES
HISTORY OF PRESENT ILLNESS  Jc Mena is a 26 y.o. male. HPI  Routine f/u.     1) HTN - reports similar home readings. Reports upper 130's-low 140's/90-92.  - He admits he didn't exercise over the winter, but he has resumed this ~1 month ago. He believes his diet has improved as well. BP Readings from Last 3 Encounters:   04/23/21 (!) 138/90   01/18/21 (!) 146/90   11/02/20 (!) 136/99   10/16/20 - 126/72    2) Depression - upcoming consultation Monday with [address], NP. Review of Systems   Constitutional: Negative for malaise/fatigue. Respiratory: Negative for shortness of breath. Cardiovascular: Negative for chest pain and leg swelling. Neurological: Negative for dizziness and headaches. Visit Vitals  BP (!) 138/90 (BP 1 Location: Right upper arm, BP Patient Position: Sitting, BP Cuff Size: Adult) Comment: manual   Pulse 75   Temp (!) 96.4 °F (35.8 °C) (Oral)   Resp 20   Ht 5' 9\" (1.753 m)   Wt 203 lb (92.1 kg)   SpO2 97%   BMI 29.98 kg/m²       Physical Exam  Constitutional:       General: He is not in acute distress. Appearance: Normal appearance. He is well-developed. HENT:      Head: Normocephalic and atraumatic. Right Ear: Tympanic membrane, ear canal and external ear normal.      Left Ear: Tympanic membrane, ear canal and external ear normal.      Nose: Nose normal.      Mouth/Throat:      Comments: Mask  Eyes:      General: No scleral icterus. Conjunctiva/sclera: Conjunctivae normal.      Pupils: Pupils are equal, round, and reactive to light. Neck:      Musculoskeletal: Neck supple. Cardiovascular:      Rate and Rhythm: Normal rate and regular rhythm. Pulses: Normal pulses. Dorsalis pedis pulses are 2+ on the right side and 2+ on the left side. Posterior tibial pulses are 2+ on the right side and 2+ on the left side. Heart sounds: Normal heart sounds. No murmur. No gallop.     Pulmonary:      Effort: Pulmonary effort is normal. No respiratory distress. Breath sounds: Normal breath sounds. No decreased breath sounds, wheezing, rhonchi or rales. Musculoskeletal:      Right lower leg: No edema. Left lower leg: No edema. Lymphadenopathy:      Head:      Right side of head: No submandibular or tonsillar adenopathy. Left side of head: No submandibular or tonsillar adenopathy. Cervical: No cervical adenopathy. Upper Body:      Right upper body: No supraclavicular adenopathy. Left upper body: No supraclavicular adenopathy. Skin:     General: Skin is warm and dry. Neurological:      Mental Status: He is alert and oriented to person, place, and time. Psychiatric:         Speech: Speech normal.         ASSESSMENT and PLAN  Diagnoses and all orders for this visit:    1. Essential hypertension  -     amLODIPine-benazepril (LOTREL) 5-20 mg per capsule; Take 2 Caps by mouth daily.   - Dosage increase. 2. Chronic insomnia  -     eszopiclone (LUNESTA) 3 mg tablet; Take 1 Tab by mouth nightly. Max Daily Amount: 3 mg.   - He has been taking 2 mg but desires to resume 3 mg dosage.  with no aberrancies. Follow-up and Dispositions    · Return in about 3 months (around 7/23/2021) for follow-up HTN.

## 2021-04-23 NOTE — PROGRESS NOTES
Leilani Pritchett is a 26 y.o. male (: 1990) presenting to address:    Chief Complaint   Patient presents with    Hypertension       Vitals:    21 0903   BP: (!) 138/90   Pulse: 75   Resp: 20   Temp: (!) 96.4 °F (35.8 °C)   TempSrc: Oral   SpO2: 97%   Weight: 203 lb (92.1 kg)   Height: 5' 9\" (1.753 m)   PainSc:   0 - No pain       Hearing/Vision:   No exam data present    Learning Assessment:     Learning Assessment 2018   PRIMARY LEARNER Patient   HIGHEST LEVEL OF EDUCATION - PRIMARY LEARNER  SOME COLLEGE   BARRIERS PRIMARY LEARNER NONE   CO-LEARNER CAREGIVER No   PRIMARY LANGUAGE ENGLISH   LEARNER PREFERENCE PRIMARY DEMONSTRATION   ANSWERED BY Patient   RELATIONSHIP SELF     Depression Screening:     3 most recent PHQ Screens 2021   Little interest or pleasure in doing things Several days   Feeling down, depressed, irritable, or hopeless Several days   Total Score PHQ 2 2     Fall Risk Assessment:   No flowsheet data found. Abuse Screening:   No flowsheet data found. Coordination of Care Questionaire:   1. Have you been to the ER, urgent care clinic since your last visit? Hospitalized since your last visit? NO    2. Have you seen or consulted any other health care providers outside of the [address] [place] [address] since your last visit? Include any pap smears or colon screening. NO    Advanced Directive:   1. Do you have an Advanced Directive? NO    2. Would you like information on Advanced Directives?  NO

## 2021-07-23 DIAGNOSIS — F51.04 CHRONIC INSOMNIA: Primary | ICD-10-CM

## 2021-07-23 RX ORDER — ESZOPICLONE 3 MG/1
3 TABLET, FILM COATED ORAL
Qty: 30 TABLET | Refills: 2 | Status: SHIPPED | OUTPATIENT
Start: 2021-07-23 | End: 2021-08-04 | Stop reason: SDUPTHER

## 2021-07-23 RX ORDER — ESZOPICLONE 2 MG/1
TABLET, FILM COATED ORAL
Qty: 30 TABLET | Refills: 2 | OUTPATIENT
Start: 2021-07-23

## 2021-08-04 ENCOUNTER — VIRTUAL VISIT (OUTPATIENT)
Dept: INTERNAL MEDICINE CLINIC | Age: 31
End: 2021-08-04
Payer: COMMERCIAL

## 2021-08-04 DIAGNOSIS — I10 ESSENTIAL HYPERTENSION: Primary | ICD-10-CM

## 2021-08-04 DIAGNOSIS — F51.04 CHRONIC INSOMNIA: ICD-10-CM

## 2021-08-04 DIAGNOSIS — F32.A CHRONIC DEPRESSION: ICD-10-CM

## 2021-08-04 PROCEDURE — 99213 OFFICE O/P EST LOW 20 MIN: CPT | Performed by: PHYSICIAN ASSISTANT

## 2021-08-04 RX ORDER — FLUOXETINE HYDROCHLORIDE 20 MG/1
20 CAPSULE ORAL DAILY
COMMUNITY
Start: 2021-07-27

## 2021-08-04 RX ORDER — LAMOTRIGINE 100 MG/1
100 TABLET ORAL DAILY
COMMUNITY

## 2021-08-04 RX ORDER — AMLODIPINE AND BENAZEPRIL HYDROCHLORIDE 5; 20 MG/1; MG/1
2 CAPSULE ORAL DAILY
Qty: 60 CAPSULE | Refills: 5 | Status: SHIPPED | OUTPATIENT
Start: 2021-08-04

## 2021-08-04 RX ORDER — ESZOPICLONE 3 MG/1
3 TABLET, FILM COATED ORAL
Qty: 30 TABLET | Refills: 5 | Status: SHIPPED | OUTPATIENT
Start: 2021-08-04

## 2021-08-04 NOTE — PROGRESS NOTES
Lia Chairez is a 26 y.o. male who was seen by synchronous (real-time) audio-video technology on 8/4/2021 for Hypertension        Assessment & Plan:     Diagnoses and all orders for this visit:    1. Essential hypertension  -     amLODIPine-benazepril (LOTREL) 5-20 mg per capsule; Take 2 Capsules by mouth daily. - Take medication at night. Pleased with decreased EtOH - continue to limit this. Work for a consistent exercise regimen.   - Cont to monitor home BP's. Hopefully with above changes, will lower DBP a little further. May need to consider additional med if this is not the case. 2. Chronic insomnia  -     eszopiclone (LUNESTA) 3 mg tablet; Take 1 Tablet by mouth nightly. Max Daily Amount: 3 mg.    3. Chronic depression   - Cont f/u with Ms. Nakia Billings of [facility].       Follow-up and Dispositions    · Return in about 3 months (around 11/4/2021) for follow-up HTN. Subjective:     Routine f/u.     1) HTN - borderline-controlled. - Home readings upper 130's/upper 80's-low 90's. - He reports for the past few weeks he has substantially decreased his EtOH consumption. He tries to limit Na+, but he does not cook, and this is difficult. He has been exercising sporadically and would like to make this more consistent.  - No AE's with Lotrel. BP Readings from Last 3 Encounters:   04/23/21 (!) 138/90   01/18/21 (!) 146/90   11/02/20 (!) 136/99         2) Mood DO - now following with Ms. Danny Lund NP of [facility] in [place] and doing very well per his report. Now taking Lamictal 100 mg daily and he is tolerating this well and believes it is working well for him. Continues to follow with his counselor regularly. Prior to Admission medications    Medication Sig Start Date End Date Taking? Authorizing Provider   FLUoxetine (PROzac) 20 mg capsule Take 20 mg by mouth daily. 7/27/21  Yes Provider, Historical   lamoTRIgine (LaMICtal) 100 mg tablet Take 100 mg by mouth daily. Yes Provider, Historical   eszopiclone (LUNESTA) 3 mg tablet Take 1 Tablet by mouth nightly. Max Daily Amount: 3 mg. 7/23/21 8/4/21  ASHVIN Mott   amLODIPine-benazepril (LOTREL) 5-20 mg per capsule Take 2 Caps by mouth daily. 4/23/21 8/4/21  ASHVIN Mott   sertraline (ZOLOFT) 100 mg tablet Take 1.5 Tabs by mouth daily. Patient not taking: Reported on 8/4/2021 6/10/20 8/4/21  ASHVIN Lamar     Past Medical History:   Diagnosis Date    Depression with anxiety     H/O multiple concussions     Insomnia        Review of Systems   Cardiovascular: Negative for leg swelling. Neurological: Negative for dizziness and headaches.        Objective:     Patient-Reported Vitals 8/4/2021   Patient-Reported Systolic  277   Patient-Reported Diastolic 92        [INSTRUCTIONS:  \"[x]\" Indicates a positive item  \"[]\" Indicates a negative item  -- DELETE ALL ITEMS NOT EXAMINED]    Constitutional: [x] Appears well-developed and well-nourished [x] No apparent distress      [] Abnormal -     Mental status: [x] Alert and awake  [x] Oriented to person/place/time [x] Able to follow commands    [] Abnormal -     Eyes:   EOM    [x]  Normal    [] Abnormal -   Sclera  [x]  Normal    [] Abnormal -          Discharge [x]  None visible   [] Abnormal -     HENT: [x] Normocephalic, atraumatic  [] Abnormal -   [x] Mouth/Throat: Mucous membranes are moist    External Ears [x] Normal  [] Abnormal -    Neck: [x] No visualized mass [] Abnormal -     Pulmonary/Chest: [x] Respiratory effort normal   [x] No visualized signs of difficulty breathing or respiratory distress        [] Abnormal -      Musculoskeletal:   [x] Normal gait with no signs of ataxia         [x] Normal range of motion of neck        [] Abnormal -     Neurological:        [x] No Facial Asymmetry (Cranial nerve 7 motor function) (limited exam due to video visit)          [x] No gaze palsy        [] Abnormal -          Skin:        [x] No significant exanthematous lesions or discoloration noted on facial skin         [] Abnormal -            Psychiatric:       [x] Normal Affect [] Abnormal -        [x] No Hallucinations    Other pertinent observable physical exam findings:-        We discussed the expected course, resolution and complications of the diagnosis(es) in detail. Medication risks, benefits, costs, interactions, and alternatives were discussed as indicated. I advised him to contact the office if his condition worsens, changes or fails to improve as anticipated. He expressed understanding with the diagnosis(es) and plan. Telma Vazquez was evaluated through a synchronous (real-time) audio-video encounter. The patient (or guardian if applicable) is aware that this is a billable service. Verbal consent to proceed has been obtained within the past 12 months. The visit was conducted pursuant to the emergency declaration under the [address] [place] [address] authority and the [employer] General Act. Patient identification was verified, and a caregiver was present when appropriate. The patient was located in a state where the provider was credentialed to provide care.       ASHVIN Jose

## 2021-08-04 NOTE — PROGRESS NOTES
Virtual Visit    Identified pt with two pt identifiers(name and ). Reviewed record in preparation for visit and have obtained necessary documentation. Chief Complaint   Patient presents with    Hypertension      Judit Click preferred language for health care discussion is english/other. Judit Click is due for:  Health Maintenance Due   Topic    Hepatitis C Screening     DTaP/Tdap/Td series (1 - Tdap)    COVID-19 Vaccine (2 - Moderna 2-dose series)     Health Maintenance reviewed and discussed per provider  Please order/place referral if appropriate. Advance Directive:  1. Do you have an advance directive in place? Patient Reply: NO    2. If not, would you like material regarding how to put one in place? NO    Coordination of Care:  1. Have you been to the ER, urgent care clinic since your last visit? Hospitalized since your last visit? NO    2. Have you seen or consulted any other health care providers outside of the [address] [place] [address] since your last visit? Include any pap smears or colon screening.  YES      Learning Assessment:  Learning Assessment 2018   PRIMARY LEARNER Patient   HIGHEST LEVEL OF EDUCATION - PRIMARY LEARNER  SOME COLLEGE   BARRIERS PRIMARY LEARNER NONE   CO-LEARNER CAREGIVER No   PRIMARY LANGUAGE ENGLISH   LEARNER PREFERENCE PRIMARY DEMONSTRATION   ANSWERED BY Patient   RELATIONSHIP SELF     Depression Screening:  3 most recent PHQ Screens 2021 4/3/2019 2018   Little interest or pleasure in doing things Several days Not at all Not at all   Feeling down, depressed, irritable, or hopeless Several days Several days Not at all   Total Score PHQ 2 2 1 0       Recent Travel Screening and Travel History documentation     Travel Screening      No screening recorded since 21 0000      Travel History   Travel since 21     No documented travel since 21

## 2022-03-19 PROBLEM — I10 ESSENTIAL HYPERTENSION: Status: ACTIVE | Noted: 2019-04-03

## 2022-03-19 PROBLEM — F41.9 ANXIETY AND DEPRESSION: Status: ACTIVE | Noted: 2019-04-03

## 2022-03-19 PROBLEM — F32.A ANXIETY AND DEPRESSION: Status: ACTIVE | Noted: 2019-04-03

## 2022-03-20 PROBLEM — F51.04 CHRONIC INSOMNIA: Status: ACTIVE | Noted: 2020-03-23

## 2022-03-20 PROBLEM — G47.00 INSOMNIA: Status: ACTIVE | Noted: 2019-04-03

## 2022-12-29 DIAGNOSIS — F41.9 ANXIETY AND DEPRESSION: Primary | ICD-10-CM

## 2022-12-29 DIAGNOSIS — F32.A ANXIETY AND DEPRESSION: Primary | ICD-10-CM

## 2022-12-29 DIAGNOSIS — Z11.59 ENCOUNTER FOR HEPATITIS C SCREENING TEST FOR LOW RISK PATIENT: ICD-10-CM

## 2022-12-29 DIAGNOSIS — Z13.0 SCREENING, ANEMIA, DEFICIENCY, IRON: ICD-10-CM

## 2022-12-29 DIAGNOSIS — Z13.1 SCREENING FOR DIABETES MELLITUS: ICD-10-CM

## 2022-12-29 DIAGNOSIS — I10 ESSENTIAL HYPERTENSION: ICD-10-CM

## 2023-01-04 ENCOUNTER — OFFICE VISIT (OUTPATIENT)
Dept: INTERNAL MEDICINE CLINIC | Age: 33
End: 2023-01-04
Payer: COMMERCIAL

## 2023-01-04 VITALS
TEMPERATURE: 97.5 F | WEIGHT: 185 LBS | HEART RATE: 71 BPM | OXYGEN SATURATION: 97 % | SYSTOLIC BLOOD PRESSURE: 138 MMHG | DIASTOLIC BLOOD PRESSURE: 99 MMHG | HEIGHT: 69 IN | BODY MASS INDEX: 27.4 KG/M2 | RESPIRATION RATE: 18 BRPM

## 2023-01-04 DIAGNOSIS — F32.A ANXIETY AND DEPRESSION: ICD-10-CM

## 2023-01-04 DIAGNOSIS — Z76.89 ESTABLISHING CARE WITH NEW DOCTOR, ENCOUNTER FOR: ICD-10-CM

## 2023-01-04 DIAGNOSIS — F41.9 ANXIETY AND DEPRESSION: ICD-10-CM

## 2023-01-04 DIAGNOSIS — I10 ESSENTIAL HYPERTENSION: Primary | ICD-10-CM

## 2023-01-04 PROCEDURE — 3080F DIAST BP >= 90 MM HG: CPT | Performed by: STUDENT IN AN ORGANIZED HEALTH CARE EDUCATION/TRAINING PROGRAM

## 2023-01-04 PROCEDURE — 3075F SYST BP GE 130 - 139MM HG: CPT | Performed by: STUDENT IN AN ORGANIZED HEALTH CARE EDUCATION/TRAINING PROGRAM

## 2023-01-04 PROCEDURE — 99204 OFFICE O/P NEW MOD 45 MIN: CPT | Performed by: STUDENT IN AN ORGANIZED HEALTH CARE EDUCATION/TRAINING PROGRAM

## 2023-01-04 RX ORDER — BUPROPION HYDROCHLORIDE 300 MG/1
300 TABLET ORAL DAILY
COMMUNITY

## 2023-01-04 RX ORDER — TRAZODONE HYDROCHLORIDE 150 MG/1
300 TABLET ORAL
COMMUNITY
Start: 2022-10-28

## 2023-01-04 RX ORDER — AMLODIPINE AND BENAZEPRIL HYDROCHLORIDE 5; 20 MG/1; MG/1
1 CAPSULE ORAL DAILY
Qty: 30 CAPSULE | Refills: 2 | Status: SHIPPED | OUTPATIENT
Start: 2023-01-04

## 2023-01-04 NOTE — PROGRESS NOTES
HISTORY OF PRESENT ILLNESS  Uzma Horn is a 27 y.o. male. New pt here to [facility]    PMHx: HTN    HTN-Previously taking benazepril-amlodipine twice daily. He was checking BP at home and exercising more and BP improved and he came off. Hasnt been on medication in 2 yrs. He is currently exercising when he can. He does not watch sodium in diet. He does have FMHx. Denies changes in vision hearing or headaches. Bipolar/Anxiety-F/w psych every 2 mo. Currently taking Welbutrin 300mg, Lamictal 100mg and Trazadone. Review of Systems   HENT:  Negative for hearing loss. Eyes:  Negative for blurred vision. Respiratory:  Negative for shortness of breath. Cardiovascular:  Negative for chest pain and palpitations. Gastrointestinal:  Negative for abdominal pain. Neurological:  Negative for dizziness. BP (!) 138/99 (BP 1 Location: Right arm, BP Patient Position: Sitting, BP Cuff Size: Adult) Comment: manual  Pulse 71   Temp 97.5 °F (36.4 °C) (Temporal)   Resp 18   Ht 5' 9\" (1.753 m)   Wt 185 lb (83.9 kg)   SpO2 97%   BMI 27.32 kg/m²     Physical Exam  Vitals reviewed. Constitutional:       Appearance: Normal appearance. HENT:      Mouth/Throat:      Comments: MASK  Eyes:      Conjunctiva/sclera: Conjunctivae normal.      Pupils: Pupils are equal, round, and reactive to light. Neck:      Vascular: No carotid bruit. Cardiovascular:      Rate and Rhythm: Normal rate and regular rhythm. Pulses: Normal pulses. Heart sounds: Normal heart sounds. Pulmonary:      Effort: Pulmonary effort is normal.      Breath sounds: Normal breath sounds. Abdominal:      General: Abdomen is flat. Musculoskeletal:      Right lower leg: No edema. Left lower leg: No edema. Psychiatric:         Mood and Affect: Mood normal.       ASSESSMENT and PLAN    ICD-10-CM ICD-9-CM    1. Essential hypertension  I10 401.9 amLODIPine-benazepril (LOTREL) 5-20 mg per capsule      2.  Anxiety and depression F41.9 300.00     F32. A 311       3. Establishing care with new doctor, encounter for  Z76.89 V65.8       BP not controlled. Repeat /99. Starting benazepril/amlodipine daily. Encouraged to check BP twice daily and bring cuff to next visit. Discussed getting 150min of weekly exercise and limiting sodium from diet following DASH diet  Anxiety/depression managed by psych. Reviewed recent notes, labs and imaging from previous providers  Follow-up and Dispositions    Return in about 1 month (around 2/4/2023) for HTN, BP recheck.

## 2024-04-01 ENCOUNTER — TELEPHONE (OUTPATIENT)
Age: 34
End: 2024-04-01

## 2024-04-01 NOTE — TELEPHONE ENCOUNTER
Patient is requesting an appt to be seen for right elbow fracture was seen a [facility] in [place], [place]; yes xrays were done and no prior elbow surgery.    Patient has [employer] insurance    [facility] in [place], [place] tel: [phone]    Patient tel [phone]

## 2024-04-01 NOTE — TELEPHONE ENCOUNTER
Unable to reach patient to get patient scheduled for an appt with either Cayla or Isidoro this week for his elbow fx. If patient is unable to get in this week, then schedule with Shakila. Faxing request for records to [facility]

## 2024-04-03 ENCOUNTER — OFFICE VISIT (OUTPATIENT)
Age: 34
End: 2024-04-03
Payer: COMMERCIAL

## 2024-04-03 VITALS — WEIGHT: 195 LBS | BODY MASS INDEX: 27.92 KG/M2 | TEMPERATURE: 98.9 F | HEIGHT: 70 IN

## 2024-04-03 DIAGNOSIS — M25.521 ARTHRALGIA OF RIGHT ELBOW: ICD-10-CM

## 2024-04-03 DIAGNOSIS — S50.11XA CONTUSION, ELBOW, WITH FOREARM, RIGHT, INITIAL ENCOUNTER: ICD-10-CM

## 2024-04-03 DIAGNOSIS — S42.401A CLOSED FRACTURE OF RIGHT ELBOW, INITIAL ENCOUNTER: Primary | ICD-10-CM
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
## 2024-04-03 DIAGNOSIS — M25.621 DECREASED RANGE OF MOTION OF RIGHT ELBOW: ICD-10-CM

## 2024-04-03 PROCEDURE — 24650 CLTX RDL HEAD/NCK FX WO MNPJ: CPT | Performed by: PHYSICIAN ASSISTANT

## 2024-04-03 PROCEDURE — 73080 X-RAY EXAM OF ELBOW: CPT | Performed by: PHYSICIAN ASSISTANT

## 2024-04-03 PROCEDURE — 99204 OFFICE O/P NEW MOD 45 MIN: CPT | Performed by: PHYSICIAN ASSISTANT

## 2024-04-03 NOTE — PROGRESS NOTES
extension.  He is to wear the brace 24/7 but may remove for hygiene purposes.  Consideration for surgical intervention based on CT results and Dr. Jhaveri's exam below.        Special note: Medication management discussed and patient will begin the following per recommended dosing.    (  ) Flector patch 1.3% topically twice daily to area of concern    (  ) Flexeril 10mg po 3 x daily as needed muscle spasm / pain    (  ) Physical therapy ordered for range of motion all modalities, stretching, range of motion of specific functional group associated     with primary treating condition,  gentle strengthening of musculature with attention to increasing endurance, gait training,balance and fine motor coordination.       Special note: I reviewed and agree with the PFSH and the ROS as well as the intake form in the chart in the record.  I have reviewed prior medical record(s) in detail regarding this patient in this care appointment.          Appropriate for outpatient management. Will discuss supportive treatment, NSAIDS, RICE and orthopedic follow-up. Discussed treatment plan, return precautions, symptomatic relief, and expected time to improvement. All questions answered. Patient is stable for discharge and outpatient management. Medication use, risk/benefit, side effects, and precautions discussed.        Care plan outlined and precautions discussed.  Results were reviewed with the patient. All medications were reviewed with the patient. All of pt's questions and concerns were addressed.  Alarm symptoms and return precautions associated with chief complaint and evaluation were reviewed with the patient in detail.  The patient demonstrated adequate understanding.The patient expresses willing compliance with the treatment plan.     Special note: Medication management discussed in detail all patient's questions answered to their satisfaction.        Patient provided a reminder for a \"due or due soon\" health maintenance. I

## 2024-04-04 ENCOUNTER — HOSPITAL ENCOUNTER (OUTPATIENT)
Facility: HOSPITAL | Age: 34
Discharge: HOME | End: 2024-04-04
Payer: COMMERCIAL

## 2024-04-04 DIAGNOSIS — S42.401A CLOSED FRACTURE OF RIGHT ELBOW, INITIAL ENCOUNTER: ICD-10-CM

## 2024-04-04 PROCEDURE — 73200 CT UPPER EXTREMITY W/O DYE: CPT

## 2024-04-09 ENCOUNTER — OFFICE VISIT (OUTPATIENT)
Age: 34
End: 2024-04-09

## 2024-04-09 VITALS — HEIGHT: 70 IN | WEIGHT: 195 LBS | TEMPERATURE: 98.2 F | BODY MASS INDEX: 27.92 KG/M2

## 2024-04-09 DIAGNOSIS — S42.401D CLOSED FRACTURE OF RIGHT ELBOW WITH ROUTINE HEALING, SUBSEQUENT ENCOUNTER: Primary | ICD-10-CM

## 2024-04-09 PROCEDURE — 99024 POSTOP FOLLOW-UP VISIT: CPT | Performed by: PHYSICIAN ASSISTANT

## 2024-04-09 NOTE — PROGRESS NOTES
Patient: Jack Sibley                MRN: [id number]       SSN: [id number]  YOB: 1990        AGE: 33 y.o.        SEX: male      OFFICE NOTE DICTATED    PCP: Candice Staley DO  04/09/24 4/9/2024: Patient returns to the office for follow-up regarding his CT scan results from the right elbow radial head fracture.  He has been compliant with his brace as directed up.  His pain as near completely resolved.        Chief Complaint   Patient presents with    Elbow Pain     Right         HISTORY:    Jack Sibley is a 33 y.o. male presents to the office status post fall on March 29, 2024.  Patient was reportedly standing on a bed trying to turn off a ceiling fan when he lost his balance falling backwards on both elbows.  He subsequently had sudden onset of pain with compromised range of motion to the right elbow and was seen through a local immediate care facility in [place].  He was found to have a minimally displaced radial head fracture of the right arm.  He was placed in a sling and referred to orthopedics.  Initially following the injury he had significant difficulty with his range of motion of the elbow which has slowly improved.  No history of prior trauma to the right elbow.      No results found for: \"HBA1C\", \"[id number]\"      4/9/2024     1:04 PM 4/3/2024     1:35 PM 1/4/2023     3:38 PM 4/23/2021     9:03 AM   Weight Metrics   Weight 195 lb 195 lb 185 lb 203 lb   BMI (Calculated) 28 kg/m2 28 kg/m2 27.4 kg/m2 30 kg/m2          Problem List Items Addressed This Visit    None        PAST MEDICAL HISTORY:       Diagnosis Date    Anxiety     Chronic depression     [facility]    H/O multiple concussions     Hypertension     Insomnia         PAST SURGICAL HISTORY:       Procedure Laterality Date    ORTHOPEDIC SURGERY Right 2011    meniscetomy        ALLERGIES:   No Known Allergies     CURRENT MEDICATIONS:  A list of medications prior to the time of

## 2024-04-16 ENCOUNTER — OFFICE VISIT (OUTPATIENT)
Age: 34
End: 2024-04-16
Payer: COMMERCIAL

## 2024-04-16 VITALS — WEIGHT: 195 LBS | HEIGHT: 70 IN | TEMPERATURE: 99.5 F | BODY MASS INDEX: 27.92 KG/M2

## 2024-04-16 DIAGNOSIS — S42.401D CLOSED FRACTURE OF RIGHT ELBOW WITH ROUTINE HEALING, SUBSEQUENT ENCOUNTER: Primary | ICD-10-CM

## 2024-04-16 PROCEDURE — 73080 X-RAY EXAM OF ELBOW: CPT | Performed by: PHYSICIAN ASSISTANT

## 2024-04-16 PROCEDURE — 99024 POSTOP FOLLOW-UP VISIT: CPT | Performed by: PHYSICIAN ASSISTANT

## 2024-04-16 NOTE — PROGRESS NOTES
Patient: Jack Sibley                MRN: [id number]       SSN: [id number]  YOB: 1990        AGE: 33 y.o.        SEX: male      OFFICE NOTE DICTATED    PCP: Candice Staley DO  04/16/24 4/16/2024: Patient returns to the office for follow-up regarding his right radial head fracture. minimally displaced patient doing well today.  He has continue work on active range of motion.  He still lacking full supination.  He is not wearing any DME for restrictive bracing.  He has no pain reported.    4/9/2024: Patient returns to the office for follow-up regarding his CT scan results from the right elbow radial head fracture.  He has been compliant with his brace as directed up.  His pain as near completely resolved.        Chief Complaint   Patient presents with    Elbow Pain     Right         HISTORY:    Jack Sibley is a 33 y.o. male presents to the office status post fall on March 29, 2024.  Patient was reportedly standing on a bed trying to turn off a ceiling fan when he lost his balance falling backwards on both elbows.  He subsequently had sudden onset of pain with compromised range of motion to the right elbow and was seen through a local immediate care facility in [place].  He was found to have a minimally displaced radial head fracture of the right arm.  He was placed in a sling and referred to orthopedics.  Initially following the injury he had significant difficulty with his range of motion of the elbow which has slowly improved.  No history of prior trauma to the right elbow.      No results found for: \"HBA1C\", \"[id number]\"      4/16/2024     1:05 PM 4/9/2024     1:04 PM 4/3/2024     1:35 PM 1/4/2023     3:38 PM 4/23/2021     9:03 AM   Weight Metrics   Weight 195 lb 195 lb 195 lb 185 lb 203 lb   BMI (Calculated) 28 kg/m2 28 kg/m2 28 kg/m2 27.4 kg/m2 30 kg/m2          Problem List Items Addressed This Visit    None  Visit Diagnoses       Closed fracture of right elbow with

## 2024-05-13 ENCOUNTER — HOSPITAL ENCOUNTER (OUTPATIENT)
Facility: HOSPITAL | Age: 34
Setting detail: RECURRING SERIES
Discharge: HOME | End: 2024-05-16
Payer: COMMERCIAL

## 2024-05-13 PROCEDURE — 97161 PT EVAL LOW COMPLEX 20 MIN: CPT

## 2024-05-13 NOTE — PROGRESS NOTES
[facility] PHYSICAL THERAPY  [address] [address] Phone: [phone] Fax   Plan of Care / Statement of Necessity for Physical Therapy Services     Patient Name: Jack Sibley : 1990   Medical   Diagnosis: Right elbow pain [M25.521] Treatment Diagnosis: M25.521  RIGHT ELBOW PAIN      Onset Date: 3/29/2024 Payor :  Payor: CLARKE / Plan[facility] / Product Type: *No Product type* /    Referral Source: Calvin Sutton PA-C Start of Care (SOC): 2024   Prior Hospitalization: See medical history Provider #: [id number]   Prior Level of Function: Independent with ADLs, functional, and work activities with no limitations.    Comorbidities: HTN, depression/anxiety, hx multiple concussions, right meniscectomy in      Assessment / key information:    Pt is a 33 year old male who presents to therapy today with right elbow pain s/p minimally displaced radial head fracture from a fall on 3/29/2024. Pt reports he was standing on a bed trying to turn off a ceiling fan when he lost his balance. He fell backwards onto both elbows. He went to an urgent care facility in [place] where he was diagnosed with the right radial head fracture. Pt reports he was placed in a sling. His current restrictions include \"normal activities with a push, pull, lift, carry restriction of 3 to 5 pounds right upper extremity\" per 2024 physician note. Pt reports his main compliant is limited right forearm supination ROM, trouble with gripping and lifting. He denies any numbness/tingling in the right UE but reports having intermittent catching in his right elbow region. Pt demonstrated decreased AROM, decreased strength, muscle tightness, and tenderness to palpation. Pt would benefit from skilled physical therapy to improve the above impairments to help the pt restore function and return to performing ADLs, functional and work activities.

## 2024-05-13 NOTE — PROGRESS NOTES
PHYSICAL / OCCUPATIONAL THERAPY - DAILY TREATMENT NOTE (updated )    Patient Name: Jack Sibley    Date: 2024    : 1990  Insurance: Payor: CLARKE / Plan: CLARKE [place] HMO / Product Type: *No Product type* /      Patient  verified Yes     Visit #   Current / Total 1 10   Time   In / Out 2:23 3:02   Pain   In / Out 2 0   Subjective Functional Status/Changes: See POC     TREATMENT AREA =  Right elbow pain [M25.521]    OBJECTIVE    15 min   Eval - untimed                      Therapeutic Procedures:  Tx Min Billable or 1:1 Min (if diff from Tx Min) Procedure, Rationale, Specifics   12 0 60498 Therapeutic Exercise (timed):  increase ROM, strength, coordination, balance, and proprioception to improve patient's ability to progress to PLOF and address remaining functional goals. (see flow sheet as applicable)     Details if applicable:  HEP instruction and demonstration     12 0 77615 Self Care/Home Management (timed):  improve patient knowledge and understanding of pain reducing techniques, positioning, posture/ergonomics, home safety, activity modification, diagnosis/prognosis, and physical therapy expectations, procedures and progression  to improve patient's ability to progress to PLOF and address remaining functional goals.  (see flow sheet as applicable)     Details if applicable:  pt education on relevant anatomy/physiology, pt education on performing gentle DTM to the right pronator teres to help with limited supination, pt education on using ice/heat for 15 mins as needed for symptoms.    24 0 MC BC Totals Reminder: bill using total billable min of TIMED therapeutic procedures (example: do not include dry needle or estim unattended, both untimed codes, in totals to left)  8-22 min = 1 unit; 23-37 min = 2 units; 38-52 min = 3 units; 53-67 min = 4 units; 68-82 min = 5 units   Total Total     TOTAL TREATMENT TIME:        39     [x]  Patient Education billed concurrently with other

## 2024-05-17 ENCOUNTER — OFFICE VISIT (OUTPATIENT)
Age: 34
End: 2024-05-17

## 2024-05-17 DIAGNOSIS — S42.401D CLOSED FRACTURE OF RIGHT ELBOW WITH ROUTINE HEALING, SUBSEQUENT ENCOUNTER: Primary | ICD-10-CM

## 2024-05-17 PROCEDURE — 99024 POSTOP FOLLOW-UP VISIT: CPT | Performed by: PHYSICIAN ASSISTANT

## 2024-05-17 NOTE — PROGRESS NOTES
Patient: Jack Sibley                MRN: [id number]       SSN: [id number]  YOB: 1990        AGE: 33 y.o.        SEX: male      OFFICE NOTE DICTATED    PCP: Candice Staley DO  05/17/24 5/17/2024: Patient returns for reimaging of his right radial head fracture nonsurgical.  He has just completed 1 session of physical therapy to date.  His pain is improved to resolved near completely.  Still has little difficulty with full extension of the right elbow.    4/16/2024: Patient returns to the office for follow-up regarding his right radial head fracture. minimally displaced patient doing well today.  He has continue work on active range of motion.  He still lacking full supination.  He is not wearing any DME for restrictive bracing.  He has no pain reported.    4/9/2024: Patient returns to the office for follow-up regarding his CT scan results from the right elbow radial head fracture.  He has been compliant with his brace as directed up.  His pain as near completely resolved.              HISTORY:    aJck Sibley is a 33 y.o. male presents to the office status post fall on March 29, 2024.  Patient was reportedly standing on a bed trying to turn off a ceiling fan when he lost his balance falling backwards on both elbows.  He subsequently had sudden onset of pain with compromised range of motion to the right elbow and was seen through a local immediate care facility in [place].  He was found to have a minimally displaced radial head fracture of the right arm.  He was placed in a sling and referred to orthopedics.  Initially following the injury he had significant difficulty with his range of motion of the elbow which has slowly improved.  No history of prior trauma to the right elbow.      No results found for: \"HBA1C\", \"[id number]\"      4/16/2024     1:05 PM 4/9/2024     1:04 PM 4/3/2024     1:35 PM 1/4/2023     3:38 PM 4/23/2021     9:03 AM   Weight Metrics   Weight 195 lb 195 lb

## 2024-05-29 ENCOUNTER — HOSPITAL ENCOUNTER (OUTPATIENT)
Facility: HOSPITAL | Age: 34
Setting detail: RECURRING SERIES
Discharge: HOME | End: 2024-06-01
Payer: COMMERCIAL

## 2024-05-29 PROCEDURE — 97112 NEUROMUSCULAR REEDUCATION: CPT

## 2024-05-29 PROCEDURE — 97110 THERAPEUTIC EXERCISES: CPT

## 2024-05-29 PROCEDURE — 97530 THERAPEUTIC ACTIVITIES: CPT

## 2024-05-29 PROCEDURE — 97535 SELF CARE MNGMENT TRAINING: CPT

## 2024-05-29 NOTE — PROGRESS NOTES
PHYSICAL / OCCUPATIONAL THERAPY - DAILY TREATMENT NOTE (updated )    Patient Name: Jack Sibley    Date: 2024    : 1990  Insurance: Payor: CLARKE / Plan: CLARKE [place] HMO / Product Type: *No Product type* /      Patient  verified Yes     Visit #   Current / Total 2 10   Time   In / Out 12:59 1:37   Pain   In / Out 0 0   Subjective Functional Status/Changes: \"I tried 10 push ups today. It was just a little sore.\"     TREATMENT AREA =  Right elbow pain [M25.521]    OBJECTIVE    PD modalities    Therapeutic Procedures:  Tx Min  Procedure, Rationale, Specifics   10  58480 Therapeutic Exercise (timed):  increase ROM, strength, coordination, balance, and proprioception to improve patient's ability to progress to PLOF and address remaining functional goals. (see flow sheet as applicable)     Details if applicable:  right UE strengthening and ROM     10  78540 Neuromuscular Re-Education (timed):  improve balance, coordination, kinesthetic sense, posture, core stability and proprioception to improve patient's ability to develop conscious control of individual muscles and awareness of position of extremities in order to progress to PLOF and address remaining functional goals. (see flow sheet as applicable)     Details if applicable:  scapular and rotator cuff re-ed to improve scapular stability in weightbearing     10  32598 Therapeutic Activity (timed):  use of dynamic activities replicating functional movements to increase ROM, strength, coordination, balance, and proprioception in order to improve patient's ability to progress to PLOF and address remaining functional goals.  (see flow sheet as applicable)     Details if applicable:  push/pull/UE weightbearing     8  25471 Self Care/Home Management (timed):  improve patient knowledge and understanding of physical therapy expectations, procedures and progression and progression of weight lifting routine  to improve patient's ability to progress

## 2024-06-04 ENCOUNTER — HOSPITAL ENCOUNTER (OUTPATIENT)
Facility: HOSPITAL | Age: 34
Setting detail: RECURRING SERIES
Discharge: HOME | End: 2024-06-07
Payer: COMMERCIAL

## 2024-06-04 PROCEDURE — 97110 THERAPEUTIC EXERCISES: CPT

## 2024-06-04 PROCEDURE — 97530 THERAPEUTIC ACTIVITIES: CPT

## 2024-06-04 PROCEDURE — 97112 NEUROMUSCULAR REEDUCATION: CPT

## 2024-06-04 NOTE — PROGRESS NOTES
PHYSICAL / OCCUPATIONAL THERAPY - DAILY TREATMENT NOTE (updated )    Patient Name: Jack Sibley    Date: 2024    : 1990  Insurance: Payor: CLARKE / Plan: CLARKE [place] HMO / Product Type: *No Product type* /      Patient  verified Yes     Visit #   Current / Total 3 10   Time   In / Out 9:01 9:39   Pain   In / Out 0 0   Subjective Functional Status/Changes: \"3/10 max pain. Slept on it wrong so I had some pain but that worked out  30 push-ups\"     TREATMENT AREA =  Right elbow pain [M25.521]    OBJECTIVE    PD modalities    Therapeutic Procedures:  Tx Min  Procedure, Rationale, Specifics   13  01392 Therapeutic Exercise (timed):  increase ROM, strength, coordination, balance, and proprioception to improve patient's ability to progress to PLOF and address remaining functional goals. (see flow sheet as applicable)     Details if applicable:  right UE strengthening and ROM  -added hammer curls  Progressed bicep curls    10  84030 Neuromuscular Re-Education (timed):  improve balance, coordination, kinesthetic sense, posture, core stability and proprioception to improve patient's ability to develop conscious control of individual muscles and awareness of position of extremities in order to progress to PLOF and address remaining functional goals. (see flow sheet as applicable)     Details if applicable:  scapular and rotator cuff re-ed to improve scapular stability in weightbearing     12  59208 Therapeutic Activity (timed):  use of dynamic activities replicating functional movements to increase ROM, strength, coordination, balance, and proprioception in order to improve patient's ability to progress to PLOF and address remaining functional goals.  (see flow sheet as applicable)     Details if applicable:  push/pull/UE weightbearing  -added snatch (Upright row to OH press)  -suitcase carry and OH carry (loaded at 90/90)     3  92105 Self Care/Home Management (timed):  improve patient knowledge

## 2024-06-12 ENCOUNTER — APPOINTMENT (OUTPATIENT)
Facility: HOSPITAL | Age: 34
End: 2024-06-12
Payer: COMMERCIAL

## 2024-06-21 ENCOUNTER — HOSPITAL ENCOUNTER (OUTPATIENT)
Facility: HOSPITAL | Age: 34
Setting detail: RECURRING SERIES
Discharge: HOME | End: 2024-06-24
Payer: COMMERCIAL

## 2024-06-21 PROCEDURE — 97530 THERAPEUTIC ACTIVITIES: CPT

## 2024-06-21 PROCEDURE — 97112 NEUROMUSCULAR REEDUCATION: CPT

## 2024-06-21 PROCEDURE — 97535 SELF CARE MNGMENT TRAINING: CPT

## 2024-06-21 PROCEDURE — 97110 THERAPEUTIC EXERCISES: CPT

## 2024-06-21 NOTE — PROGRESS NOTES
[facility] [place]   [address] [place] [address]  Phone: [phone] Fax: [phone]  PROGRESS NOTE  Patient Name: Jack Sibley : 1990   Treatment/Medical Diagnosis: Right elbow pain [M25.521]   Referral Source:  Payor Calvin Sutton PA-C  Payor: CLARKE / Plan[facility] CL[facility]KE [facility] / Product Type: *No Product type* /      Date of Initial Visit: 2024 Attended Visits: 4 Missed Visits: 0     SUMMARY OF TREATMENT  Pt is a 33 year old male who presents to therapy today with right elbow pain s/p minimally displaced radial head fracture from a fall on 3/29/2024.  Treatment including; 07104 Therapeutic Exercise, 46325 Neuromuscular Re-Education, 79084 Manual Therapy, 82744 Therapeutic Activity, 02612 Self Care/Home Management.      CURRENT STATUS  Pt attending 4 visits since their SOC for R elbow pain on 2024.  They report 50% improvement since this time with a 6-7/10 max pain level during explosive full elbow extension activities, such as open ended punching and using punching bag during workouts to progress and maintain healthy lifestyle habits.  Lowest reported pain level is 0/10.  Functional improvements of ability to complete push up during workouts are noted, though they remain to report/demonstrate difficulty with above stated activities that cause max pain levels.  They have demonstrated improvements in functional elbow and  strength, as well as elbow ROM for decreased pain during OH work duties and regular workout routine.  They will benefit from further skilled therapy to improve explosive full elbow extension and weight bearing full elbow extension for return to pain free prior workout routine to return to healthy lifestyle as well as work tasks such as lifting up to 50# OH for work duties.        Reassessment for PN:  Pain at best 0/10, at worst 6-7/10  Subjective % improvement 50%     Objective:   
pronation/supination to 5/5 with minimal to no increased pain to improve ability to tolerate lifting when appropriate.  Status at last PN: progressing, Elbow strength: B flexion/extension 5/5 . Wrist MMT not tested  3.  Pt will increase FOTO score to 74 points to improve ability to perform ADLs.  Status at IE: 57 points  Status at last PN: NT this period, test at next 30 day assessment or at NV  4. Pt will report 2/10 max pain level during completion of explosive total elbow extension activities such as working out with punching bag and lifting 50 lbs OH for work specific duties.    Status at last PN: 6-7/10 max pain during punching bag workouts      PN Due 7/21/24  Auth 30 visits expires 10/31/24    PLAN  Yes  Continue plan of care  [x]  Upgrade activities as tolerated  []  Discharge due to :  []  Other:     RAMA CERNA, XOCHITL    6/21/2024    1:01 PM    Future Appointments   Date Time Provider Department Center   6/26/2024  1:00 PM [facility]   7/3/2024  1:00 PM Monika Trimble, PT [facility]

## 2024-06-26 ENCOUNTER — HOSPITAL ENCOUNTER (OUTPATIENT)
Facility: HOSPITAL | Age: 34
Setting detail: RECURRING SERIES
Discharge: HOME | End: 2024-06-29
Payer: COMMERCIAL

## 2024-06-26 PROCEDURE — 97112 NEUROMUSCULAR REEDUCATION: CPT

## 2024-06-26 PROCEDURE — 97110 THERAPEUTIC EXERCISES: CPT

## 2024-06-26 PROCEDURE — 97530 THERAPEUTIC ACTIVITIES: CPT

## 2024-06-26 PROCEDURE — 97535 SELF CARE MNGMENT TRAINING: CPT

## 2024-06-26 NOTE — PROGRESS NOTES
movements of the right wrist with work activities.   Status at last PN: progressing, Supination: 90, Pronation: 90, wrist flexion - not tested , no reported pain  Current: (6/26/24) goal met,     2. Pt will increase MMT right elbow EXT and wrist pronation/supination to 5/5 with minimal to no increased pain to improve ability to tolerate lifting when appropriate.  Status at last PN: progressing, Elbow strength: B flexion/extension 5/5 . Wrist MMT not tested  Current: (6/26/24) goal progressing, Wrist MMT: Ext 4+/5, Flex 4+/5    3.  Pt will increase FOTO score to 74 points to improve ability to perform ADLs.  Status at IE: 57 points  Status at last PN: NT this period, test at next 30 day assessment or at NV  Current: (6/26/24) progressing, 66/100    4. Pt will report 2/10 max pain level during completion of explosive total elbow extension activities such as working out with punching bag and lifting 50 lbs OH for work specific duties.    Status at last PN: 6-7/10 max pain during punching bag workouts  Current: (6/26/24) progressing, reporting no pain with 50# weighted OH work duties        PN Due 7/21/24  Auth 30 visits expires 10/31/24    PLAN  Yes  Continue plan of care  [x]  Upgrade activities as tolerated  []  Discharge due to :  []  Other:     RAMA CERNA PTA    6/26/2024    10:50 AM    Future Appointments   Date Time Provider Department Center   6/26/2024  1:00 PM [facility] PT YUE 1 [facility]   7/3/2024  1:00 PM Monika Trimble, PT [facility]   7/10/2024  1:00 PM John Koenig, PT [facility]   7/17/2024  8:20 AM Autumn Koch, PT [facility]   7/24/2024  8:20 AM Autumn Koch, PT [facility]   7/31/2024  8:20 AM Autumn Koch, PT [facility]

## 2024-07-03 ENCOUNTER — HOSPITAL ENCOUNTER (OUTPATIENT)
Facility: HOSPITAL | Age: 34
Setting detail: RECURRING SERIES
Discharge: HOME | End: 2024-07-06
Payer: COMMERCIAL

## 2024-07-03 PROCEDURE — 97112 NEUROMUSCULAR REEDUCATION: CPT

## 2024-07-03 PROCEDURE — 97530 THERAPEUTIC ACTIVITIES: CPT

## 2024-07-03 PROCEDURE — 97110 THERAPEUTIC EXERCISES: CPT

## 2024-07-03 NOTE — PROGRESS NOTES
PHYSICAL / OCCUPATIONAL THERAPY - DAILY TREATMENT NOTE (updated )    Patient Name: Jack Sibley    Date: 7/3/2024    : 1990  Insurance: Payor: CLARKE / Plan[facility] / Product Type: *No Product type* /      Patient  verified Yes     Visit #   Current / Total 2 10   Time   In / Out 1:00 1:35   Pain   In / Out 0 0   Subjective Functional Status/Changes: \"I just had a little pain hitting the punching bag.\"     TREATMENT AREA =  Right elbow pain [M25.521]    OBJECTIVE    PD modalities    Therapeutic Procedures:  Tx Min  Procedure, Rationale, Specifics   10  69008 Therapeutic Exercise (timed):  increase ROM, strength, coordination, balance, and proprioception to improve patient's ability to progress to PLOF and address remaining functional goals. (see flow sheet as applicable)     Details if applicable:  right UE strengthening and ROM     10  45802 Neuromuscular Re-Education (timed):  improve balance, coordination, kinesthetic sense, posture, core stability and proprioception to improve patient's ability to develop conscious control of individual muscles and awareness of position of extremities in order to progress to PLOF and address remaining functional goals. (see flow sheet as applicable)     Details if applicable:  scapular and rotator cuff re-ed to improve scapular stability in weightbearing     10  29715 Therapeutic Activity (timed):  use of dynamic activities replicating functional movements to increase ROM, strength, coordination, balance, and proprioception in order to improve patient's ability to progress to PLOF and address remaining functional goals.  (see flow sheet as applicable)     Details if applicable:  push/pull/UE weightbearing     39  [facility] Totals Reminder: bill using total billable min of TIMED therapeutic procedures (example: do not include dry needle or estim unattended, both untimed codes, in totals to left)  8-22 min = 1 unit; 23-37 min = 2 units; 38-52 min =

## 2024-07-10 ENCOUNTER — HOSPITAL ENCOUNTER (OUTPATIENT)
Facility: HOSPITAL | Age: 34
Setting detail: RECURRING SERIES
Discharge: HOME | End: 2024-07-13
Payer: COMMERCIAL

## 2024-07-10 PROCEDURE — 97140 MANUAL THERAPY 1/> REGIONS: CPT

## 2024-07-10 PROCEDURE — 97110 THERAPEUTIC EXERCISES: CPT

## 2024-07-10 PROCEDURE — 97530 THERAPEUTIC ACTIVITIES: CPT

## 2024-07-10 PROCEDURE — 97112 NEUROMUSCULAR REEDUCATION: CPT

## 2024-07-10 NOTE — PROGRESS NOTES
PHYSICAL / OCCUPATIONAL THERAPY - DAILY TREATMENT NOTE (updated )    Patient Name: Jack Sibley    Date: 7/10/2024    : 1990  Insurance: Payor: CLARKE / Plan: CLARKE [place] HMO / Product Type: *No Product type* /      Patient  verified Yes     Visit #   Current / Total 3 10   Time   In / Out 3:40 pm 4:21 pm   Pain   In / Out 0/10 0/10   Subjective Functional Status/Changes: Patient reports reduced discomfort in the elbow recently when punching a punching bag. He notes doing medicine ball throws at the gym without issue.     TREATMENT AREA =  Right elbow pain [M25.521]    OBJECTIVE    PD modalities    Therapeutic Procedures:  Tx Min Procedure, Rationale, Specifics   9 23503 Therapeutic Exercise (timed):  increase ROM, strength, coordination, balance, and proprioception to improve patient's ability to progress to PLOF and address remaining functional goals. (see flow sheet as applicable)     Details if applicable:  right UE strengthening and ROM     13 02573 Neuromuscular Re-Education (timed):  improve balance, coordination, kinesthetic sense, posture, core stability and proprioception to improve patient's ability to develop conscious control of individual muscles and awareness of position of extremities in order to progress to PLOF and address remaining functional goals. (see flow sheet as applicable)     Details if applicable:  scapular and rotator cuff re-ed to improve scapular stability in weightbearing, PNF diagonal patterns (emphasis on elbow extension with shoulder rotation)     11 37194 Therapeutic Activity (timed):  use of dynamic activities replicating functional movements to increase ROM, strength, coordination, balance, and proprioception in order to improve patient's ability to progress to PLOF and address remaining functional goals.  (see flow sheet as applicable)     Details if applicable:  push/pull/UE weightbearing     8 87600 Manual Therapy (timed):  decrease pain, increase

## 2024-07-17 ENCOUNTER — HOSPITAL ENCOUNTER (OUTPATIENT)
Facility: HOSPITAL | Age: 34
Setting detail: RECURRING SERIES
Discharge: HOME | End: 2024-07-20
Payer: COMMERCIAL

## 2024-07-17 PROCEDURE — 97110 THERAPEUTIC EXERCISES: CPT

## 2024-07-17 PROCEDURE — 97530 THERAPEUTIC ACTIVITIES: CPT

## 2024-07-17 PROCEDURE — 97112 NEUROMUSCULAR REEDUCATION: CPT

## 2024-07-17 NOTE — PROGRESS NOTES
[facility] [place]   [address] [place] [address]  Phone: [phone] Fax: [phone]  PROGRESS NOTE  Patient Name: Jack Sibley : 1990   Treatment/Medical Diagnosis: Right elbow pain [M25.521]   Referral Source:  Payor Calvin Sutton PA-C  Payor: CLARKE / Plan: CLARKE [place] / Product Type: *No Product type* /      Date of Initial Visit: 2024 Attended Visits: 7 Missed Visits: 1     SUMMARY OF TREATMENT  Patient is a 33 year old male who has been treated in PT for right elbow pain s/p minimally displaced radial head fracture from a fall on 3/29/2024.  Treatment including; 58670 Therapeutic Exercise, 38042 Neuromuscular Re-Education, 94928 Manual Therapy, 42449 Therapeutic Activity, 01889 Self Care/Home Management.      CURRENT STATUS    Subjective Improvements: range of motion, strength, pain intensity/frequency   Percentage of Perceived Improvement: 80-85%   Remaining Subjective Deficits: regular workout routine (dead lifts, bench, medicine ball throws), residual soreness after activities,      Pain Pattern:   -Best: 0/10   -Worst: 2/10      Strength:  90 Deg Elbow Flexion (R) 84.6   Elbow Extended (R): 75.3     Elbow Strength: grossly 5/5 with MMT  Wrist Strength: grossly 5/5 with MMT    Right Elbow AROM:   Flexion: 140   Extension: 0  Supination: 90   Pronation: 90    Right Wrist AROM:  Flexion 80 degrees  Extension 75 degrees  Radial deviation 16 deg  Ulnar deviation 34 degrees     Functional Movements:  90-90 's carry: tolerating 15 pounds  OH Press: 15 pound KB    Push Ups (wide): 16 repetitions max (pain free, fatigue limiting factor)   Bicep Curl 3 way: tolerating 15 pounds for 2 sets of 10 repetitions     FOTO: 83    Patient will continue to benefit from skilled PT / OT services to modify and progress therapeutic interventions, analyze and address functional mobility deficits, analyze and address

## 2024-07-17 NOTE — PROGRESS NOTES
PHYSICAL / OCCUPATIONAL THERAPY - DAILY TREATMENT NOTE (updated )    Patient Name: Jack Sibley    Date: 2024    : 1990  Insurance: Payor: CLARKE / Plan: CLARKE [place] HMO / Product Type: *No Product type* /      Patient  verified Yes     Visit #   Current / Total 4 10   Time   In / Out 8:21 AM 9:00 AM   Pain   In / Out 0/10 0/10   Subjective Functional Status/Changes: \"I am doing alright, still have discomfort if I work out.\"      TREATMENT AREA =  Right elbow pain [M25.521]    OBJECTIVE      Therapeutic Procedures:  Tx Min  39 Procedure, Rationale, Specifics   8 20006 Therapeutic Exercise (timed):  increase ROM, strength, coordination, balance, and proprioception to improve patient's ability to progress to PLOF and address remaining functional goals. (see flow sheet as applicable)     Details if applicable: strengthening, stretching      8 27618 Neuromuscular Re-Education (timed):  improve balance, coordination, kinesthetic sense, posture, core stability and proprioception to improve patient's ability to develop conscious control of individual muscles and awareness of position of extremities in order to progress to PLOF and address remaining functional goals. (see flow sheet as applicable)     Details if applicable:  dynamic stability (RTC, wrist, elbow)     23 61728 Therapeutic Activity (timed):  use of dynamic activities replicating functional movements to increase ROM, strength, coordination, balance, and proprioception in order to improve patient's ability to progress to PLOF and address remaining functional goals.  (see flow sheet as applicable)     Details if applicable:  reassessment for PN, patient education components, functional movements     39 [facility] Totals Reminder: bill using total billable min of TIMED therapeutic procedures (example: do not include dry needle or estim unattended, both untimed codes, in totals to left)  8-22 min = 1 unit; 23-37 min = 2 units; 38-52 min =

## 2024-07-24 ENCOUNTER — HOSPITAL ENCOUNTER (OUTPATIENT)
Facility: HOSPITAL | Age: 34
Setting detail: RECURRING SERIES
Discharge: HOME | End: 2024-07-27
Payer: COMMERCIAL

## 2024-07-24 PROCEDURE — 97112 NEUROMUSCULAR REEDUCATION: CPT

## 2024-07-24 PROCEDURE — 97530 THERAPEUTIC ACTIVITIES: CPT

## 2024-07-24 PROCEDURE — 97110 THERAPEUTIC EXERCISES: CPT

## 2024-07-24 NOTE — PROGRESS NOTES
PHYSICAL / OCCUPATIONAL THERAPY - DAILY TREATMENT NOTE (updated )    Patient Name: Jack Sibley    Date: 2024    : 1990  Insurance: Payor: CLARKE / [facility] / Product Type: *No Product type* /      Patient  verified Yes     Visit #   Current / Total 1 4   Time   In / Out 8:20 AM 9:00 AM   Pain   In / Out 0/10 0/10   Subjective Functional Status/Changes: \"I have been going to the gym more consistently. I started dead lifting again and my  is not where it used to be.\"      TREATMENT AREA =  Right elbow pain [M25.521]    OBJECTIVE      Therapeutic Procedures:  Tx Min  40 Procedure, Rationale, Specifics   9 41450 Therapeutic Exercise (timed):  increase ROM, strength, coordination, balance, and proprioception to improve patient's ability to progress to PLOF and address remaining functional goals. (see flow sheet as applicable)     Details if applicable: UBE, strengthening      23 10821 Neuromuscular Re-Education (timed):  improve balance, coordination, kinesthetic sense, posture, core stability and proprioception to improve patient's ability to develop conscious control of individual muscles and awareness of position of extremities in order to progress to PLOF and address remaining functional goals. (see flow sheet as applicable)     Details if applicable:  wrist/elbow/shoulder stabilization     8 80721 Therapeutic Activity (timed):  use of dynamic activities replicating functional movements to increase ROM, strength, coordination, balance, and proprioception in order to improve patient's ability to progress to PLOF and address remaining functional goals.  (see flow sheet as applicable)     Details if applicable:  functional movements, patient education components      40 [facility] Totals Reminder: bill using total billable min of TIMED therapeutic procedures (example: do not include dry needle or estim unattended, both untimed codes, in totals to left)  8-22 min = 1 unit;

## 2024-07-31 ENCOUNTER — HOSPITAL ENCOUNTER (OUTPATIENT)
Facility: HOSPITAL | Age: 34
Setting detail: RECURRING SERIES
Discharge: HOME | End: 2024-08-03
Payer: COMMERCIAL

## 2024-07-31 PROCEDURE — 97530 THERAPEUTIC ACTIVITIES: CPT

## 2024-07-31 PROCEDURE — 97112 NEUROMUSCULAR REEDUCATION: CPT

## 2024-07-31 PROCEDURE — 97110 THERAPEUTIC EXERCISES: CPT

## 2024-07-31 NOTE — PROGRESS NOTES
min = 4 units; 68-82 min = 5 units   Total     [x]  Patient Education billed concurrently with other procedures  [x] Review HEP    [] Progressed/Changed HEP, detail:  [] Other detail:    Objective Information/Functional Measures/Assessment:  Continues to demonstrate difficulty with small oscillations with body blade, form improves by second set    Challenged with staggered hand placement with push ups to improve right UE weight bearing   Introduced TRX tricep extensions and inverted rows for added stability and gripping challenge     Patient will continue to benefit from skilled PT / OT services to modify and progress therapeutic interventions, analyze and address functional mobility deficits, analyze and address ROM deficits, analyze and address strength deficits, analyze and address soft tissue restrictions, analyze and cue for proper movement patterns, and analyze and modify for postural abnormalities to address functional deficits and attain remaining goals.    Progress toward goals / Updated goals:  [x]  See Progress Note/Recertification  -Patient will be able to perform at least 20 wide push ups with no increased pain in order to indicate improved capacity to return to regular gym program.  Status at last assessment: 16 repetitions before fatigue  Current: goal met, 20 repetitions tolerated (7/24/2024)   -Patient will increase right  strength (elbow flexed to 90 degrees) to at least 88 pound average for 3 trials to maintain proper  with heavy dumbbells and medicine balls in regular gym workout.   Status at last assessment: 84.6 pounds   Current: progressing, addressing with functional strengthening (7/31/2024)   -Patient will be able to perform 10 repetitions of overhead press with at least 20 pounds to indicate progression to pre injury fitness level with no increased pain.  Status at last assessment: 15 pounds  -Patient to report no more than 1/10 pain/discomfort in a 5 day period to indicate

## 2024-08-07 ENCOUNTER — HOSPITAL ENCOUNTER (OUTPATIENT)
Facility: HOSPITAL | Age: 34
Setting detail: RECURRING SERIES
Discharge: HOME | End: 2024-08-10
Payer: COMMERCIAL

## 2024-08-07 PROCEDURE — 97112 NEUROMUSCULAR REEDUCATION: CPT

## 2024-08-07 PROCEDURE — 97530 THERAPEUTIC ACTIVITIES: CPT

## 2024-08-07 PROCEDURE — 97110 THERAPEUTIC EXERCISES: CPT

## 2024-08-07 NOTE — PROGRESS NOTES
PHYSICAL / OCCUPATIONAL THERAPY - DAILY TREATMENT NOTE (updated )    Patient Name: Jack Sibley    Date: 2024    : 1990  Insurance: Payor: CLARKE / Plan: CLARKE [place] HMO / Product Type: *No Product type* /      Patient  verified Yes     Visit #   Current / Total 3 4   Time   In / Out 11:00 AM 11:39 AM   Pain   In / Out 0/10 0/10   Subjective Functional Status/Changes: \"Things at the gym have been going well. I did okay after last session.\"      TREATMENT AREA =  Right elbow pain [M25.521]    OBJECTIVE      Therapeutic Procedures:  Tx Min  39 Procedure, Rationale, Specifics   8 44214 Therapeutic Exercise (timed):  increase ROM, strength, coordination, balance, and proprioception to improve patient's ability to progress to PLOF and address remaining functional goals. (see flow sheet as applicable)     Details if applicable: UBE, strengthening, stretches     23 32836 Neuromuscular Re-Education (timed):  improve balance, coordination, kinesthetic sense, posture, core stability and proprioception to improve patient's ability to develop conscious control of individual muscles and awareness of position of extremities in order to progress to PLOF and address remaining functional goals. (see flow sheet as applicable)     Details if applicable:  wrist/elbow/shoulder stabilization     8 64863 Therapeutic Activity (timed):  use of dynamic activities replicating functional movements to increase ROM, strength, coordination, balance, and proprioception in order to improve patient's ability to progress to PLOF and address remaining functional goals.  (see flow sheet as applicable)     Details if applicable:  functional movements, patient education components      39 [facility] Totals Reminder: bill using total billable min of TIMED therapeutic procedures (example: do not include dry needle or estim unattended, both untimed codes, in totals to left)  8-22 min = 1 unit; 23-37 min = 2 units; 38-52 min = 3

## 2024-08-14 ENCOUNTER — HOSPITAL ENCOUNTER (OUTPATIENT)
Facility: HOSPITAL | Age: 34
Setting detail: RECURRING SERIES
Discharge: HOME | End: 2024-08-17
Payer: COMMERCIAL

## 2024-08-14 PROCEDURE — 97112 NEUROMUSCULAR REEDUCATION: CPT

## 2024-08-14 PROCEDURE — 97535 SELF CARE MNGMENT TRAINING: CPT

## 2024-08-14 PROCEDURE — 97110 THERAPEUTIC EXERCISES: CPT

## 2024-08-14 PROCEDURE — 97530 THERAPEUTIC ACTIVITIES: CPT

## 2024-08-14 NOTE — PROGRESS NOTES
Physical Therapy Discharge Instructions      [facility]   [address] [address] [phone] fax      Patient: Jack Sibley  : 1990      Continue Home Exercise Program 4-5 times per week for 4 weeks (specifically before/after gym program to maintain flexibility), then decrease to 2-3 times per week    Follow up with MD:     [x] As needed      Recommendations:     [x]   Return to activity with the following modifications:    [x]Return to/join local gym        Additional Comments: Continue with regular gym program to maintain strength. Return to PT if you notice increased pain and decreased functional capacity.           uAtumn Koch, PT 2024 7:31 AM   
assessment: 16 repetitions before fatigue  Current: goal met, 20 repetitions tolerated (7/24/2024)   -Patient will increase right  strength (elbow flexed to 90 degrees) to at least 88 pound average for 3 trials to maintain proper  with heavy dumbbells and medicine balls in regular gym workout.   Status at last assessment: 84.6 pounds   Current: goal met, 90 pound average over 3 trials (8/14/2024)   -Patient will be able to perform 10 repetitions of overhead press with at least 20 pounds to indicate progression to pre injury fitness level with no increased pain.  Status at last assessment: 15 pounds  Current: goal met, 10 repetitions with 20 pounds (8/14/2024)   -Patient to report no more than 1/10 pain/discomfort in a 5 day period to indicate stabilization of condition, ability to perform work tasks and regular gym program.  Status at last assessment: 2/10 with heavy activities    Current: goal met, 1/10 at worse while bench pressing that resolved with rest (8/14/2024)     RECOMMENDATIONS  Discharge due to level of progress towards goals, no self reported functional deficits, and independence with gym/HEP routine     If you have any questions/comments please contact us directly at [phone].   Thank you for allowing us to assist in the care of your patient.  PTA signature       Therapist Signature: Autumn Koch PT Date: 8/14/2024   Reporting Period: 5/13/2024-8/14/2024 Time: 7:31 AM     
goals.  (see flow sheet as applicable)     Details if applicable:  functional movements, patient education components, reassessment for discharge       37 [facility] Totals Reminder: bill using total billable min of TIMED therapeutic procedures (example: do not include dry needle or estim unattended, both untimed codes, in totals to left)  8-22 min = 1 unit; 23-37 min = 2 units; 38-52 min = 3 units; 53-67 min = 4 units; 68-82 min = 5 units   Total     [x]  Patient Education billed concurrently with other procedures  [x] Review HEP    [] Progressed/Changed HEP, detail:  [] Other detail:    Objective Information/Functional Measures/Assessment:    Subjective Improvements: strength, decreased pain, returning to gym program              Percentage of Perceived Improvement: 100%     Remaining Subjective Deficits: none reported      Pain Pattern:              -Best: 0/10              -Worst: 1-2/10 (while bench pressing)      Strength:  90 Deg Elbow Flexion (R) 90 average over three trials  Elbow Extended (R): 80 average over three trials      Elbow Strength: grossly 5/5 with MMT  Wrist Strength: grossly 5/5 with MMT     Right Elbow AROM:  WNL and pain free      Right Wrist AROM: WNL and pain free      Functional Movements:  OH Press: 20 pound   Push Ups (wide): 27 repetitions before fatigue   Bent Over Row + Tricep Extension: able to perform 2 sets of 10 repetitions with 20 pounds     Progress toward goals / Updated goals:    -Patient will be able to perform at least 20 wide push ups with no increased pain in order to indicate improved capacity to return to regular gym program.  Status at last assessment: 16 repetitions before fatigue  Current: goal met, 20 repetitions tolerated (7/24/2024)   -Patient will increase right  strength (elbow flexed to 90 degrees) to at least 88 pound average for 3 trials to maintain proper  with heavy dumbbells and medicine balls in regular gym workout.   Status at last assessment:

## 2025-01-28 ENCOUNTER — OFFICE VISIT (OUTPATIENT)
Facility: CLINIC | Age: 35
End: 2025-01-28
Payer: COMMERCIAL

## 2025-01-28 VITALS
HEIGHT: 70 IN | HEART RATE: 88 BPM | SYSTOLIC BLOOD PRESSURE: 144 MMHG | DIASTOLIC BLOOD PRESSURE: 94 MMHG | TEMPERATURE: 97.2 F | BODY MASS INDEX: 29.78 KG/M2 | OXYGEN SATURATION: 97 % | WEIGHT: 208 LBS | RESPIRATION RATE: 16 BRPM

## 2025-01-28 DIAGNOSIS — I10 ESSENTIAL HYPERTENSION: Primary | ICD-10-CM

## 2025-01-28 DIAGNOSIS — Z13.31 POSITIVE DEPRESSION SCREENING: ICD-10-CM

## 2025-01-28 DIAGNOSIS — N52.9 ERECTILE DYSFUNCTION, UNSPECIFIED ERECTILE DYSFUNCTION TYPE: ICD-10-CM

## 2025-01-28 PROCEDURE — 99214 OFFICE O/P EST MOD 30 MIN: CPT | Performed by: STUDENT IN AN ORGANIZED HEALTH CARE EDUCATION/TRAINING PROGRAM

## 2025-01-28 PROCEDURE — 3080F DIAST BP >= 90 MM HG: CPT | Performed by: STUDENT IN AN ORGANIZED HEALTH CARE EDUCATION/TRAINING PROGRAM

## 2025-01-28 PROCEDURE — 3075F SYST BP GE 130 - 139MM HG: CPT | Performed by: STUDENT IN AN ORGANIZED HEALTH CARE EDUCATION/TRAINING PROGRAM

## 2025-01-28 RX ORDER — BUPROPION HYDROCHLORIDE 300 MG/1
300 TABLET ORAL EVERY MORNING
COMMUNITY

## 2025-01-28 RX ORDER — TRAZODONE HYDROCHLORIDE 150 MG/1
150 TABLET ORAL NIGHTLY
COMMUNITY
Start: 2024-03-24

## 2025-01-28 RX ORDER — AMLODIPINE AND BENAZEPRIL HYDROCHLORIDE 5; 20 MG/1; MG/1
2 CAPSULE ORAL DAILY
Qty: 180 CAPSULE | Refills: 0 | Status: SHIPPED | OUTPATIENT
Start: 2025-01-28

## 2025-01-28 RX ORDER — VILAZODONE HYDROCHLORIDE 40 MG/1
40 TABLET ORAL DAILY
COMMUNITY
Start: 2024-02-06

## 2025-01-28 SDOH — ECONOMIC STABILITY: FOOD INSECURITY: WITHIN THE PAST 12 MONTHS, THE FOOD YOU BOUGHT JUST DIDN'T LAST AND YOU DIDN'T HAVE MONEY TO GET MORE.: NEVER TRUE

## 2025-01-28 SDOH — ECONOMIC STABILITY: FOOD INSECURITY: WITHIN THE PAST 12 MONTHS, YOU WORRIED THAT YOUR FOOD WOULD RUN OUT BEFORE YOU GOT MONEY TO BUY MORE.: NEVER TRUE

## 2025-01-28 ASSESSMENT — PATIENT HEALTH QUESTIONNAIRE - PHQ9
5. POOR APPETITE OR OVEREATING: MORE THAN HALF THE DAYS
3. TROUBLE FALLING OR STAYING ASLEEP: NOT AT ALL
6. FEELING BAD ABOUT YOURSELF - OR THAT YOU ARE A FAILURE OR HAVE LET YOURSELF OR YOUR FAMILY DOWN: NOT AT ALL
SUM OF ALL RESPONSES TO PHQ9 QUESTIONS 1 & 2: 4
2. FEELING DOWN, DEPRESSED OR HOPELESS: MORE THAN HALF THE DAYS
SUM OF ALL RESPONSES TO PHQ QUESTIONS 1-9: 10
SUM OF ALL RESPONSES TO PHQ QUESTIONS 1-9: 10
8. MOVING OR SPEAKING SO SLOWLY THAT OTHER PEOPLE COULD HAVE NOTICED. OR THE OPPOSITE, BEING SO FIGETY OR RESTLESS THAT YOU HAVE BEEN MOVING AROUND A LOT MORE THAN USUAL: NOT AT ALL
10. IF YOU CHECKED OFF ANY PROBLEMS, HOW DIFFICULT HAVE THESE PROBLEMS MADE IT FOR YOU TO DO YOUR WORK, TAKE CARE OF THINGS AT HOME, OR GET ALONG WITH OTHER PEOPLE: NOT DIFFICULT AT ALL
SUM OF ALL RESPONSES TO PHQ QUESTIONS 1-9: 10
9. THOUGHTS THAT YOU WOULD BE BETTER OFF DEAD, OR OF HURTING YOURSELF: NOT AT ALL
4. FEELING TIRED OR HAVING LITTLE ENERGY: MORE THAN HALF THE DAYS
7. TROUBLE CONCENTRATING ON THINGS, SUCH AS READING THE NEWSPAPER OR WATCHING TELEVISION: MORE THAN HALF THE DAYS
SUM OF ALL RESPONSES TO PHQ QUESTIONS 1-9: 10
1. LITTLE INTEREST OR PLEASURE IN DOING THINGS: MORE THAN HALF THE DAYS

## 2025-01-28 ASSESSMENT — ENCOUNTER SYMPTOMS
SHORTNESS OF BREATH: 0
ABDOMINAL PAIN: 0

## 2025-01-28 NOTE — PROGRESS NOTES
\"Have you been to the ER, urgent care clinic since your last visit?  Hospitalized since your last visit?\"    YES - When: approximately 3 months ago.  Where and Why: Urgent Care.    “Have you seen or consulted any other health care providers outside of [facility] since your last visit?”    NO            Click Here for Release of Records Request

## 2025-01-28 NOTE — PROGRESS NOTES
HISTORY OF PRESENT ILLNESS  Jack Sibley is a 34 y.o. male presenting today for   Chief Complaint   Patient presents with    BP check    Erectile Dysfunction        Presents today to follow up on chronic conditions. Reports the following complaints today:  HTN- He is not currently taking his medication. He has been checking BP at home and reports similar numbers.  He is staying physically activing and improving his diet. He does note worsening of his his erectile dysfunction, in particular with initiating an erection. He admits that some of this could be related to anxiety as well.  He has never really discussed this before.           Medications reviewed and updated.    Review of Systems   Eyes:  Negative for visual disturbance.   Respiratory:  Negative for shortness of breath.    Cardiovascular:  Negative for chest pain.   Gastrointestinal:  Negative for abdominal pain.   Neurological:  Negative for headaches.         BP (!) 138/91 (Site: Right Upper Arm, Position: Sitting, Cuff Size: Medium Adult)   Pulse 88   Temp 97.2 °F (36.2 °C) (Temporal)   Resp 16   Ht 1.778 m (5' 10\")   Wt 94.3 kg (208 lb)   SpO2 97%   BMI 29.84 kg/m²     Physical Exam  HENT:      Mouth/Throat:      Comments: MASK  Eyes:      Pupils: Pupils are equal, round, and reactive to light.   Cardiovascular:      Rate and Rhythm: Normal rate and regular rhythm.   Pulmonary:      Effort: Pulmonary effort is normal.      Breath sounds: Normal breath sounds.   Musculoskeletal:      Right lower leg: No edema.      Left lower leg: No edema.   Psychiatric:         Mood and Affect: Mood normal.         ASSESSMENT and PLAN    ICD-10-CM    1. Essential hypertension  I10 amLODIPine-benazepril (LOTREL) 5-20 MG per capsule      2. Erectile dysfunction, unspecified erectile dysfunction type  N52.9 Testosterone, free, total      3. Positive depression screening  Z13.31       HTN-Chronic  -Not at goal. Repeat BP elevated today.   -Restarting Lotrel

## 2025-02-06 ENCOUNTER — HOSPITAL ENCOUNTER (OUTPATIENT)
Facility: HOSPITAL | Age: 35
Setting detail: SPECIMEN
Discharge: HOME | End: 2025-02-09

## 2025-02-06 LAB — SENTARA SPECIMEN COLLECTION: NORMAL

## 2025-02-06 PROCEDURE — 99001 SPECIMEN HANDLING PT-LAB: CPT

## 2025-02-10 LAB
TESTOSTERONE FREE: 91.6 PG/ML (ref 35–155)
TESTOSTERONE: 407 NG/DL (ref 250–1100)

## 2025-02-13 DIAGNOSIS — N52.9 ERECTILE DYSFUNCTION, UNSPECIFIED ERECTILE DYSFUNCTION TYPE: Primary | ICD-10-CM

## 2025-05-13 ENCOUNTER — OFFICE VISIT (OUTPATIENT)
Facility: CLINIC | Age: 35
End: 2025-05-13
Payer: COMMERCIAL

## 2025-05-13 VITALS
BODY MASS INDEX: 28.52 KG/M2 | RESPIRATION RATE: 18 BRPM | OXYGEN SATURATION: 97 % | SYSTOLIC BLOOD PRESSURE: 103 MMHG | DIASTOLIC BLOOD PRESSURE: 66 MMHG | WEIGHT: 199.2 LBS | HEART RATE: 89 BPM | HEIGHT: 70 IN | TEMPERATURE: 97.2 F

## 2025-05-13 DIAGNOSIS — I10 ESSENTIAL HYPERTENSION: Primary | ICD-10-CM

## 2025-05-13 DIAGNOSIS — N52.9 ERECTILE DYSFUNCTION, UNSPECIFIED ERECTILE DYSFUNCTION TYPE: ICD-10-CM

## 2025-05-13 PROCEDURE — 99213 OFFICE O/P EST LOW 20 MIN: CPT | Performed by: STUDENT IN AN ORGANIZED HEALTH CARE EDUCATION/TRAINING PROGRAM

## 2025-05-13 PROCEDURE — 3078F DIAST BP <80 MM HG: CPT | Performed by: STUDENT IN AN ORGANIZED HEALTH CARE EDUCATION/TRAINING PROGRAM

## 2025-05-13 PROCEDURE — 3074F SYST BP LT 130 MM HG: CPT | Performed by: STUDENT IN AN ORGANIZED HEALTH CARE EDUCATION/TRAINING PROGRAM

## 2025-05-13 RX ORDER — AMLODIPINE AND BENAZEPRIL HYDROCHLORIDE 5; 20 MG/1; MG/1
2 CAPSULE ORAL DAILY
Qty: 180 CAPSULE | Refills: 0 | Status: SHIPPED | OUTPATIENT
Start: 2025-05-13

## 2025-05-13 RX ORDER — CARIPRAZINE 3 MG/1
3 CAPSULE, GELATIN COATED ORAL DAILY
COMMUNITY
Start: 2025-05-12

## 2025-05-13 ASSESSMENT — PATIENT HEALTH QUESTIONNAIRE - PHQ9
4. FEELING TIRED OR HAVING LITTLE ENERGY: SEVERAL DAYS
5. POOR APPETITE OR OVEREATING: NOT AT ALL
SUM OF ALL RESPONSES TO PHQ QUESTIONS 1-9: 2
SUM OF ALL RESPONSES TO PHQ QUESTIONS 1-9: 2
9. THOUGHTS THAT YOU WOULD BE BETTER OFF DEAD, OR OF HURTING YOURSELF: NOT AT ALL
SUM OF ALL RESPONSES TO PHQ QUESTIONS 1-9: 2
1. LITTLE INTEREST OR PLEASURE IN DOING THINGS: SEVERAL DAYS
SUM OF ALL RESPONSES TO PHQ QUESTIONS 1-9: 2
7. TROUBLE CONCENTRATING ON THINGS, SUCH AS READING THE NEWSPAPER OR WATCHING TELEVISION: MORE THAN HALF THE DAYS
10. IF YOU CHECKED OFF ANY PROBLEMS, HOW DIFFICULT HAVE THESE PROBLEMS MADE IT FOR YOU TO DO YOUR WORK, TAKE CARE OF THINGS AT HOME, OR GET ALONG WITH OTHER PEOPLE: SOMEWHAT DIFFICULT
8. MOVING OR SPEAKING SO SLOWLY THAT OTHER PEOPLE COULD HAVE NOTICED. OR THE OPPOSITE, BEING SO FIGETY OR RESTLESS THAT YOU HAVE BEEN MOVING AROUND A LOT MORE THAN USUAL: NOT AT ALL
SUM OF ALL RESPONSES TO PHQ QUESTIONS 1-9: 7
SUM OF ALL RESPONSES TO PHQ QUESTIONS 1-9: 7
2. FEELING DOWN, DEPRESSED OR HOPELESS: SEVERAL DAYS
1. LITTLE INTEREST OR PLEASURE IN DOING THINGS: SEVERAL DAYS
2. FEELING DOWN, DEPRESSED OR HOPELESS: SEVERAL DAYS
6. FEELING BAD ABOUT YOURSELF - OR THAT YOU ARE A FAILURE OR HAVE LET YOURSELF OR YOUR FAMILY DOWN: SEVERAL DAYS
SUM OF ALL RESPONSES TO PHQ QUESTIONS 1-9: 7
3. TROUBLE FALLING OR STAYING ASLEEP: SEVERAL DAYS
SUM OF ALL RESPONSES TO PHQ QUESTIONS 1-9: 7

## 2025-05-13 ASSESSMENT — ENCOUNTER SYMPTOMS
SHORTNESS OF BREATH: 0
ABDOMINAL PAIN: 0

## 2025-05-13 NOTE — PROGRESS NOTES
Have you been to the ER, urgent care clinic since your last visit?  Hospitalized since your last visit?   NO    Have you seen or consulted any other health care providers outside our system since your last visit?   YES - When: approximately 2  weeks ago.  Where and Why: urgent care in [facility].

## 2025-05-13 NOTE — PROGRESS NOTES
Jack Sibley (:  1990) is a 34 y.o. male, Established patient, here for evaluation of the following chief complaint(s):  Hypertension         Assessment & Plan  1. Hypertension  - Blood pressure is well controlled on the current regimen of Lotrel 5/20 mg, taking 2 capsules by mouth once a day.  -Treatment plan: Discussed medication adherence and confirmed no immediate need for refills; however, provided refill for Lotrel 5/20 mg today due to upcoming maternity leave.  Clinical decision making: Follow-up scheduled for 2025 for physical and updated blood work.    2. Erectile dysfunction  - Established care with urology; testosterone levels found to be low-normal.  Treatment plan: Started on tadalafil as needed.  Clinical decision making: Follow-up with urology scheduled in 4 months.    3. Depression and anxiety  - Conditions managed by psychiatrist.  Clinical decision making: Defer further management to psychiatrist.    Follow-up: Patient will follow up in 6 months for a physical.    Results    1. Essential hypertension  -     amLODIPine-benazepril (LOTREL) 5-20 MG per capsule; Take 2 capsules by mouth daily, Disp-180 capsule, R-0Normal  2. Erectile dysfunction, unspecified erectile dysfunction type    Return in about 6 months (around 2025) for APE.       Subjective   History of Present Illness  The patient is a 34-year-old male presenting today for hypertension. At his last visit in 2025, he was restarted on Lotrel 5/20 mg once a day, and concerns for erectile dysfunction were brought up, for which he was referred to urology. He established care with them and was started on tadalafil as needed, advised to follow up in 2025.    He reports overall good health since his last visit. His blood pressure is well controlled. He has been adhering to his prescribed regimen of Lotrel, taking 2 capsules orally once daily. He does not require any medication refills at this time but expresses a